# Patient Record
Sex: MALE | Race: WHITE | NOT HISPANIC OR LATINO | Employment: STUDENT | URBAN - METROPOLITAN AREA
[De-identification: names, ages, dates, MRNs, and addresses within clinical notes are randomized per-mention and may not be internally consistent; named-entity substitution may affect disease eponyms.]

---
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
## 2024-02-07 NOTE — PROGRESS NOTES
CC:  Chronic low back pain.  Right sided.    18 y.o. Male who presents as a new patient to me. 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 Complaint is right hip pain and right lower back pain for many years. He reports the pain began when he was playing football and got hit directly onto his right lower back and iliac crest region. 3 years ago he was seen by Peds Ortho and underwent PT for his low back which helped alleviate his symptoms. He subsequently went on to finish high school sports and now plays for [employer]. He reports last year he had another flare up of back pain which time he was seen by a chiropractor for treatment.  He states those treatments helped, but the relief was always temporary. He rates his pain as a baseline 2/10.  Right side of the lower back region.  Denies any numbness or tingling or sciatica type symptoms.  He notices it most after he throws bullpen, not necessarily while pitching. Better with rest. Treatment thus far has included rest, activity modifications, oral medications and PT. Here today to discuss diagnosis and treatment options.      PMHx notable for chronic LBP.   Negative for tobacco.   Negative for diabetes. Last A1C: n/a    REVIEW OF SYSTEMS:   Constitution: Negative. Negative for chills, fever and night sweats.    Hematologic/Lymphatic: Negative for bleeding problem. Does not bruise/bleed easily.   Skin: Negative for dry skin, itching and rash.   Musculoskeletal: Negative for falls. Positive for chronic lumbar pain.     All other review of symptoms were reviewed and found to be noncontributory.     PAST MEDICAL HISTORY:   Past Medical History:   Diagnosis Date    Asthma      PAST SURGICAL HISTORY:   History reviewed. No pertinent surgical history.    FAMILY HISTORY:   History reviewed. No pertinent family history.    SOCIAL HISTORY:   Social History     Socioeconomic History    Marital status: Single   Tobacco Use    Smoking status: Never    Smokeless tobacco:  "Never   Substance and Sexual Activity    Alcohol use: No    Drug use: No    Sexual activity: Never   Social History Narrative    11th grader    Lives with parents    2 siblings     Plays football and baseball      MEDICATIONS:     Current Outpatient Medications:     budesonide/formoterol fumarate (SYMBICORT INHL), Inhale into the lungs., Disp: , Rfl:     ibuprofen (ADVIL,MOTRIN) 100 mg/5 mL suspension, Take by mouth every 6 (six) hours as needed for Temperature greater than., Disp: , Rfl:     montelukast sodium (SINGULAIR ORAL), Take by mouth., Disp: , Rfl:     celecoxib (CELEBREX) 200 MG capsule, Take 1 capsule (200 mg total) by mouth 2 (two) times daily., Disp: 30 capsule, Rfl: 2    ALLERGIES:   Review of patient's allergies indicates:   Allergen Reactions    Latex, natural rubber Hives     PHYSICAL EXAMINATION:  /67   Pulse (!) 53   Ht 5' 10" (1.778 m)   Wt 90.7 kg (200 lb)   BMI 28.70 kg/m²   General: Well-developed well-nourished 18 y.o. malein no acute distress   Cardiovascular: Regular rhythm by palpation of distal pulse, normal color and temperature, no concerning varicosities on symptomatic side   Lungs: No labored breathing or wheezing appreciated   Neuro: Alert and oriented ×3   Psychiatric: well oriented to person, place and time, demonstrates normal mood and affect   Skin: No rashes, lesions or ulcers, normal temperature, turgor, and texture on involved extremity    Ortho/SPM Exam  Examination of the lumbar spine demonstrates mildly reduced lumbar range of motion with some pain provoked on forced extension.  Some generalized tightness with forward bending.  Tenderness to palpation at the L3 to 5 level more so on the right side.  No specific paraspinal tenderness.  Nontender over the SI joints.  No tenderness specifically over the midline.  Negative straight leg raise on the right or left lower extremity.  Motor and sensory intact to the right lower extremity.  Negative Stinchfield test.  No " pain with passive internal and external rotation of the right hip.  5/5 resisted quad, hamstring, anterior tibialis, EHL, gastrocsoleus.  Sensory intact to the entire right lower extremity.  No pathologic reflexes.  Knee popliteal angle is 80° bilaterally.  Some subjective core weakness.    IMAGING:  X-rays of lumbar spine ordered and images reviewed by me show:    Demonstrates no acute fracture. Disc heights appear to be well-maintained thought the visualizes thoracic and lumbar spines. No evidence of zeus or retrolisthesis. Normal lumbar lordosis.  No obvious spondylolysis.  No spondylolisthesis.      ASSESSMENT:      ICD-10-CM ICD-9-CM   1. Chronic right-sided low back pain without sciatica  M54.50 724.2    G89.29 338.29       PLAN:     -Findings and treatment options were discussed with the patient.  History of chronic recurrent low back pain, right-sided more than left in the absence of radicular symptoms.  Prior conservative treatment has been extensive.  Pain present after pitching primarily and is to some degree activity limiting.  Plain films were negative but certainly the patient may have a spondylolysis.  We discussed further workup to include MRI.  He would like to proceed with that.  -PT Mikki (Back/Core Strengthening) - go ahead and start that.  We will have Mickey Chakraborty coordinate with the patient's school  for treatment at school as well.  -Celebrex QD 200mg  -RTC virtually to discuss MRI results and further treatment recommendations.  -All questions answered

## 2024-02-08 ENCOUNTER — TELEPHONE (OUTPATIENT)
Dept: SPORTS MEDICINE | Facility: CLINIC | Age: 19
End: 2024-02-08
Payer: COMMERCIAL

## 2024-02-08 ENCOUNTER — HOSPITAL ENCOUNTER (OUTPATIENT)
Dept: RADIOLOGY | Facility: HOSPITAL | Age: 19
Discharge: HOME | End: 2024-02-08
Attending: ORTHOPAEDIC SURGERY
Payer: COMMERCIAL

## 2024-02-08 ENCOUNTER — OFFICE VISIT (OUTPATIENT)
Dept: SPORTS MEDICINE | Facility: CLINIC | Age: 19
End: 2024-02-08
Payer: COMMERCIAL

## 2024-02-08 VITALS
BODY MASS INDEX: 28.63 KG/M2 | HEART RATE: 53 BPM | DIASTOLIC BLOOD PRESSURE: 67 MMHG | WEIGHT: 200 LBS | HEIGHT: 70 IN | SYSTOLIC BLOOD PRESSURE: 125 MMHG

## 2024-02-08 DIAGNOSIS — G89.29 CHRONIC RIGHT-SIDED LOW BACK PAIN WITHOUT SCIATICA: Primary | ICD-10-CM

## 2024-02-08 DIAGNOSIS — M54.50 CHRONIC RIGHT-SIDED LOW BACK PAIN WITHOUT SCIATICA: Primary | ICD-10-CM

## 2024-02-08 DIAGNOSIS — M54.50 LUMBAR PAIN: ICD-10-CM

## 2024-02-08 PROCEDURE — 99999 PR PBB SHADOW E&M-EST. PATIENT-LVL IV: CPT | Mod: PBBFAC,,, | Performed by: ORTHOPAEDIC SURGERY

## 2024-02-08 PROCEDURE — 3078F DIAST BP <80 MM HG: CPT | Mod: CPTII,S$GLB,, | Performed by: ORTHOPAEDIC SURGERY

## 2024-02-08 PROCEDURE — 72110 X-RAY EXAM L-2 SPINE 4/>VWS: CPT | Mod: TC

## 2024-02-08 PROCEDURE — 3008F BODY MASS INDEX DOCD: CPT | Mod: CPTII,S$GLB,, | Performed by: ORTHOPAEDIC SURGERY

## 2024-02-08 PROCEDURE — 99204 OFFICE O/P NEW MOD 45 MIN: CPT | Mod: S$GLB,,, | Performed by: ORTHOPAEDIC SURGERY

## 2024-02-08 PROCEDURE — 3074F SYST BP LT 130 MM HG: CPT | Mod: CPTII,S$GLB,, | Performed by: ORTHOPAEDIC SURGERY

## 2024-02-08 PROCEDURE — 72110 X-RAY EXAM L-2 SPINE 4/>VWS: CPT | Mod: 26,,, | Performed by: RADIOLOGY

## 2024-02-08 PROCEDURE — 1159F MED LIST DOCD IN RCRD: CPT | Mod: CPTII,S$GLB,, | Performed by: ORTHOPAEDIC SURGERY

## 2024-02-08 RX ORDER — CELECOXIB 200 MG/1
200 CAPSULE ORAL 2 TIMES DAILY
Qty: 30 CAPSULE | Refills: 2 | Status: SHIPPED | OUTPATIENT
Start: 2024-02-08 | End: 2024-02-19 | Stop reason: SDUPTHER

## 2024-02-08 NOTE — TELEPHONE ENCOUNTER
Spoke c pt. Informed pt that Dr. Rosa will no longer be seeing pts at scheduled appt time on 02/08/24. Pt will head to clinic now. Confirmed appt date, time, location. Pt will call c additional questions/concerns in interim. Pt expressed understanding & was thankful.

## 2024-02-16 ENCOUNTER — CLINICAL SUPPORT (OUTPATIENT)
Dept: REHABILITATION | Facility: HOSPITAL | Age: 19
End: 2024-02-16
Payer: COMMERCIAL

## 2024-02-16 DIAGNOSIS — G89.29 CHRONIC RIGHT-SIDED LOW BACK PAIN WITHOUT SCIATICA: ICD-10-CM

## 2024-02-16 DIAGNOSIS — R29.3 ABNORMAL POSTURE: Primary | ICD-10-CM

## 2024-02-16 DIAGNOSIS — M54.50 CHRONIC RIGHT-SIDED LOW BACK PAIN WITHOUT SCIATICA: ICD-10-CM

## 2024-02-16 PROCEDURE — 97161 PT EVAL LOW COMPLEX 20 MIN: CPT | Mod: PO

## 2024-02-16 PROCEDURE — 97110 THERAPEUTIC EXERCISES: CPT | Mod: PO

## 2024-02-16 NOTE — PLAN OF CARE
[facility]   Physical Therapy Initial Evaluation      Name: Donald Grewal  Clinic Number: [id number]    Therapy Diagnosis:   Encounter Diagnoses   Name Primary?    Chronic right-sided low back pain without sciatica     Abnormal posture Yes        Physician: Bam Woodward DO    Physician Orders: PT Eval and Treat   Medical Diagnosis from Referral: Chronic right-sided low back pain without sciatica [M54.50, G89.29]   Evaluation Date: 2/16/2024  Authorization Period Expiration: 12/31/2024  Plan of Care Expiration: 5/16/2024  Progress Note Due: 3/16/2024  Date of Surgery: na  Visit # / Visits authorized: 1/ 1   FOTO: 1/ 3    Precautions: Standard ,asthma, latex allergy     Time In: 8:00 a   Time Out: 9:00 a  Total Billable Time: 60 minutes    Subjective     Date of onset: 2.5 years ago     History of current condition - Donald reports:     He has right sided lower back pain that began 2.5 years ago after being tackled during football. Sometimes the pain goes into the right glute but most of the time it stays in his back. 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 He will be getting an MRI on Sunday. He denies back surgeries, NT, denies bladder or bowel problems. He has been the DC and PT before and it both helped him     Chief complaint: right sided lumbar pain following pitching     Falls: denies     Imaging:   FINDINGS:  Very slight scoliosis convex to the left in the lumbar region is appreciated, but no significant alignment abnormality is observed.  Vertebral body heights are normally maintained, without compression deformity at any level.  No significant disc narrowing.  No spondylitic defects.  No significant vertebral segmentation anomalies.  Vertebral endplates are well defined.  No osseous destructive process.  SI joints appear unremarkable.     Impression:     No significant abnormality.  No significant detrimental  interval change since the examinations referenced above is appreciated.    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    Medications:   Donald has a current medication list which includes the following prescription(s): budesonide/formoterol fumarate, celecoxib, ibuprofen, and montelukast sodium.    Allergies:   Review of patient's allergies indicates:   Allergen Reactions    Latex, natural rubber Hives        Objective      LE MMT   Glute med 4+/5 bilat   Glute max 4+/5 bilat     LE MYOTOMES WNL     SPECIAL TESTS   SLR neg bilat   Si jt compression/distraction neg   LLNT neg sciatic nerve   Femoral compression negative bilat     POSTURE   APT, forward head, rounded shoulders     PALPATION   Mild ttp R si joint, R QL   Increased muscle tone right sided T-s/L-s     MOBILITY   TS 2/6   LS 3/6   UPA lumbar no reactivity     MLA   Short hip flexors, piriformis, hs, quads     LS AROM   Flexion wfl * preference   Ext wfl * p   Rotation L wfl   Rotation R wfl, * p ipsilateral   SB wfl bilat     Intake Outcome Measure for FOTO  Survey    Therapist reviewed FOTO scores for Donald Grewal on 2/16/2024.   FOTO report - see Media section or FOTO account episode details.    Intake Score: 62%         Treatment     Total Treatment time (time-based codes) separate from Evaluation: 20 minutes     Donald received the treatments listed below:      therapeutic exercises to develop strength, endurance, ROM, flexibility, posture, and core stabilization for 20 minutes  including:    Hs stretch strap 3 x 20s   Supine hip ER IR AROM 30x   Quadruped fire hydrants  5 sec holds x 10 green   Prone on SB 3 x 45 sec     Next visit consider:   STM self massage with lax ball   Thread needles         Patient Education and Home Exercises     Education provided:   - HEP  - Etiology    Written Home Exercises Provided: yes. Exercises were reviewed and Donald was able to demonstrate them prior to the end of the session.  Donald demonstrated good  understanding of the education provided. See EMR under Patient Instructions for exercises provided during therapy sessions.    Assessment     Donald is a 18 y.o. male referred to outpatient Physical Therapy with a medical diagnosis of Chronic right-sided low back pain without sciatica [M54.50, G89.29] . Patient presents with s/s consistent with LUMBAR SPINE PAIN with POWER COORDINATION DEFICITS. Pt presents with LE flexibility and strength deficits like contributing to his symptoms. Pt with signs of likely right sided lumbar spine closing dysfunction. Pt will benefit from skilled PT for return to pitching and college sports without any limitations.     Patient prognosis is Excellent.   Patient will benefit from skilled outpatient Physical Therapy to address the deficits stated above and in the chart below, provide patient /family education, and to maximize patientt's level of independence.     Plan of care discussed with patient: Yes  Patient's spiritual, cultural and educational needs considered and patient is agreeable to the plan of care and goals as stated below:     Anticipated Barriers for therapy: none     Medical Necessity is demonstrated by the following  History  Co-morbidities and personal factors that may impact the plan of care [x] LOW: no personal factors / co-morbidities  [] MODERATE: 1-2 personal factors / co-morbidities  [] HIGH: 3+ personal factors / co-morbidities    Moderate / High Support Documentation:   Co-morbidities affecting plan  of care:     Personal Factors:        Examination  Body Structures and Functions, activity limitations and participation restrictions that may impact the plan of care [x] LOW: addressing 1-2 elements  [] MODERATE: 3+ elements  [] HIGH: 4+ elements (please support below)    Moderate / High Support Documentation:      Clinical Presentation [x] LOW: stable  [] MODERATE: Evolving  [] HIGH: Unstable     Decision Making/ Complexity Score: low       Goals:  Short Term Goals: 4 weeks   Pt demonstrate indep with initial hep   Pt demonstrate lumbar spine AROM pain-free all directions     Long Term Goals: 12 weeks   Pt demonstrate 5/5 gluteal mmt to improve strength and stability for pitching   Pt demonstrate improved foto score from 62 to 75  Pt report no difficulty or pain pitching 40 pitches in a game.   Plan     Plan of care Certification: 2/16/2024 to 5/16/2024.    Outpatient Physical Therapy 2 times weekly for 10 weeks to include the following interventions: Electrical Stimulation precautions cleared , Manual Therapy, Moist Heat/ Ice, Neuromuscular Re-ed, Patient Education, Self Care, Therapeutic Activities, and Therapeutic Exercise.     Jay Gustafson, PT        Physician's Signature: _________________________________________ Date: ________________

## 2024-02-18 ENCOUNTER — HOSPITAL ENCOUNTER (OUTPATIENT)
Dept: RADIOLOGY | Facility: HOSPITAL | Age: 19
Discharge: HOME | End: 2024-02-18
Attending: STUDENT IN AN ORGANIZED HEALTH CARE EDUCATION/TRAINING PROGRAM
Payer: COMMERCIAL

## 2024-02-18 DIAGNOSIS — G89.29 CHRONIC RIGHT-SIDED LOW BACK PAIN WITHOUT SCIATICA: ICD-10-CM

## 2024-02-18 DIAGNOSIS — M54.50 CHRONIC RIGHT-SIDED LOW BACK PAIN WITHOUT SCIATICA: ICD-10-CM

## 2024-02-18 PROCEDURE — 72148 MRI LUMBAR SPINE W/O DYE: CPT | Mod: TC

## 2024-02-18 PROCEDURE — 72148 MRI LUMBAR SPINE W/O DYE: CPT | Mod: 26,,, | Performed by: RADIOLOGY

## 2024-02-18 NOTE — PROGRESS NOTES
Telemedicine/Virtual Visit Documentation:     The patient location is: home    The chief complaint leading to consultation is: see HPI    VISIT TYPE X   Virtual visit with synchronous audio and video x   Telephone E/M service      Total time spent with patient: see X ely on chart below.   More than half of the time was spent counseling or coordinating care including prognosis, differential diagnosis, risks and benefits of treatment, instructions, compliance risk reductions     EST MINUTES X   20079 5    68140 10    19029 15 x   99214 25    62893 40    NEW     37198 10    34660 20    62444 30    71213 45    54981 60    PHONE      5-10    58196 11-20    98883 21-30      H&P  Orthopaedics      SUBJECTIVE:     History of Present Illness:    Donald Grewal who presents for MRI review of lumbar spine. Concern for lumbar disc pathology.  The patient states his back is feeling better.  He has started physical therapy at the [facility].  Has not picked up or started the Celebrex.  He does feel that he can throw and hit in that respect can return to practice.  He is redshirting this year.  No lumbar radicular complaints.    Prior Hx 2/8/2024:  18 y.o. Male who presents as a new patient to me. 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 Complaint is right hip pain and right lower back pain for many years. He reports the pain began when he was playing football and got hit directly onto his right lower back and iliac crest region. 3 years ago he was seen by Peds Ortho and underwent PT for his low back which helped alleviate his symptoms. He subsequently went on to finish high school sports and now plays for [employer]. He reports last year he had another flare up of back pain which time he was seen by a chiropractor for treatment.  He states those treatments helped, but the relief was always temporary. He rates his pain as a baseline 2/10.  Right side of the lower back region.  Denies any numbness or tingling or  sciatica type symptoms.  He notices it most after he throws bullpen, not necessarily while pitching. Better with rest. Treatment thus far has included rest, activity modifications, oral medications and PT. Here today to discuss diagnosis and treatment options.       PMHx notable for chronic LBP.   Negative for tobacco.   Negative for diabetes. Last A1C: n/a    Review of patient's allergies indicates:   Allergen Reactions    Latex, natural rubber Hives       Past Medical History:   Diagnosis Date    Asthma      No past surgical history on file.  No family history on file.  Social History     Tobacco Use    Smoking status: Never    Smokeless tobacco: Never   Substance Use Topics    Alcohol use: No    Drug use: No      Review of Systems:  Patient denies constitutional symptoms, cardiac symptoms, respiratory symptoms, GI symptoms.  The remainder of the musculoskeletal ROS is included in the HPI.    OBJECTIVE:     Physical Exam:  Gen:  No acute distress    MSK:  MRI lumbar spine reviewed by me and discussed with patient. Study shows:   Mild broad-based posterior disc bulge at L5-S1. No spinal canal stenosis.     ASSESSMENT/PLAN:     A/P: Donald Grewal is a 18 y.o. with L5-S1 disc herniation    Plan:  Imaging reviewed with the patient.  Conservative care recommended.  Getting better with the initial physical therapy.  Prescription for Celebrex resubmitted to a new pharmacy location.  May participate in baseball activities to tolerance.  I do not think we need any further workup or treatment at this time.  I will discuss with his school .  All questions answered.  Continue a core strengthening and hamstring stretching program.

## 2024-02-19 ENCOUNTER — OFFICE VISIT (OUTPATIENT)
Dept: SPORTS MEDICINE | Facility: CLINIC | Age: 19
End: 2024-02-19
Payer: COMMERCIAL

## 2024-02-19 DIAGNOSIS — M54.50 CHRONIC RIGHT-SIDED LOW BACK PAIN WITHOUT SCIATICA: ICD-10-CM

## 2024-02-19 DIAGNOSIS — G89.29 CHRONIC RIGHT-SIDED LOW BACK PAIN WITHOUT SCIATICA: ICD-10-CM

## 2024-02-19 DIAGNOSIS — M51.26 LUMBAR DISC HERNIATION: Primary | ICD-10-CM

## 2024-02-19 PROCEDURE — 99213 OFFICE O/P EST LOW 20 MIN: CPT | Mod: 95,,, | Performed by: ORTHOPAEDIC SURGERY

## 2024-02-19 RX ORDER — CELECOXIB 200 MG/1
200 CAPSULE ORAL 2 TIMES DAILY
Qty: 30 CAPSULE | Refills: 2 | Status: SHIPPED | OUTPATIENT
Start: 2024-02-19

## 2024-02-26 ENCOUNTER — CLINICAL SUPPORT (OUTPATIENT)
Dept: REHABILITATION | Facility: HOSPITAL | Age: 19
End: 2024-02-26
Payer: COMMERCIAL

## 2024-02-26 DIAGNOSIS — R29.3 ABNORMAL POSTURE: Primary | ICD-10-CM

## 2024-02-26 PROCEDURE — 97110 THERAPEUTIC EXERCISES: CPT | Mod: PO

## 2024-02-26 NOTE — PROGRESS NOTES
"[facility]   Physical Therapy Treatment Note      Name: Donald Grewal  Clinic Number: [id number]    Therapy Diagnosis:   Encounter Diagnosis   Name Primary?    Abnormal posture Yes     Physician: Bam Woodward DO    Visit Date: 2/26/2024    Physician Orders: PT Eval and Treat   Medical Diagnosis from Referral: Chronic right-sided low back pain without sciatica [M54.50, G89.29]   Evaluation Date: 2/16/2024  Authorization Period Expiration: 12/31/2024  Plan of Care Expiration: 5/16/2024  Progress Note Due: 3/16/2024  Date of Surgery: na  Visit # / Visits authorized: 1/ 20   FOTO: 1/ 3     Precautions: Standard ,asthma, latex allergy      Time In: 4:00  Time Out: 4:40  Total Billable Time: 40 minutes    PTA Visit #: 0/5       Subjective     Patient reports: felt better after last visit .  He was compliant with home exercise program.  Response to previous treatment: initial eval  Functional change: ongoing    Pain: 0/10  Location: right lower thoracic and lumbar spine      Objective      Objective Measures updated at progress report unless specified.     Treatment     Donald received the treatments listed below:      therapeutic exercises to develop strength, endurance, ROM, flexibility, posture, and core stabilization for 40 minutes including:  Supine hamstring stretch 3x30"  Supine hip flexor stretch x1 min B  Supine hip ER IR AROM x30 B   Thread the needle x10 B  Open books 15x5" B  Quadruped fire hydrants  5 sec holds x 10 green   Bird dogs 2x10  Planks 2x30" each way   Paloff press with green superband 2x10   Prone on SB 3 x 45 sec      Next visit consider:   STM self massage with lax ball       manual therapy techniques: Joint mobilizations, Manual traction, and Soft tissue Mobilization were applied for 0 minutes, including:      neuromuscular re-education activities to improve: Coordination, Kinesthetic, Sense, and Proprioception for 0 minutes. The following activities were " included:      therapeutic activities to improve functional performance for 0  minutes, including:        Patient Education and Home Exercises       Education provided:   - HEP  - Etiology     Written Home Exercises Provided: yes. Exercises were reviewed and Donald was able to demonstrate them prior to the end of the session.  Donald demonstrated good  understanding of the education provided. See EMR under Patient Instructions for exercises provided during therapy sessions.    Assessment     Donald tolerated session well today with some reports of pain with quadruped bird dogs and paloff press on right side. Despite discomfort, he was able to complete exercises. Will continue to progress with focus on spinal mobility and core strengthening and proper core engagement during dynamic activity.     Donald Is progressing well towards his goals.   Patient prognosis is Excellent.     Patient will continue to benefit from skilled outpatient physical therapy to address the deficits listed in the problem list box on initial evaluation, provide pt/family education and to maximize pt's level of independence in the home and community environment.     Patient's spiritual, cultural and educational needs considered and pt agreeable to plan of care and goals.     Anticipated barriers to physical therapy: yes    Goals: (not met, progressing unless otherwise)  Short Term Goals: 4 weeks   Pt demonstrate indep with initial hep   Pt demonstrate lumbar spine AROM pain-free all directions      Long Term Goals: 12 weeks   Pt demonstrate 5/5 gluteal mmt to improve strength and stability for pitching   Pt demonstrate improved foto score from 62 to 75  Pt report no difficulty or pain pitching 40 pitches in a game.     Plan       Plan of care Certification: 2/16/2024 to 5/16/2024.     Continue plan of care    Bonita Sellers, PT      The patient is a 52y Male complaining of

## 2024-02-28 ENCOUNTER — CLINICAL SUPPORT (OUTPATIENT)
Dept: REHABILITATION | Facility: HOSPITAL | Age: 19
End: 2024-02-28
Payer: COMMERCIAL

## 2024-02-28 DIAGNOSIS — R29.3 ABNORMAL POSTURE: Primary | ICD-10-CM

## 2024-02-28 PROCEDURE — 97110 THERAPEUTIC EXERCISES: CPT | Mod: PO

## 2024-02-28 PROCEDURE — 97140 MANUAL THERAPY 1/> REGIONS: CPT | Mod: PO

## 2024-02-28 NOTE — PROGRESS NOTES
"[facility]   Physical Therapy Treatment Note      Name: Donald Grewal  Clinic Number: [id number]    Therapy Diagnosis:   Encounter Diagnosis   Name Primary?    Abnormal posture Yes     Physician: Bam Woodward DO    Visit Date: 2/28/2024    Physician Orders: PT Eval and Treat   Medical Diagnosis from Referral: Chronic right-sided low back pain without sciatica [M54.50, G89.29]   Evaluation Date: 2/16/2024  Authorization Period Expiration: 12/31/2024  Plan of Care Expiration: 5/16/2024  Progress Note Due: 3/16/2024  Date of Surgery: na  Visit # / Visits authorized: 2/ 20   FOTO: 1/ 3     Precautions: Standard ,asthma, latex allergy      Time In: 4:30 p   Time Out: 5:20 p   Total Billable Time: 25 minutes 1-1 with PT     PTA Visit #: 0/5       Subjective     Patient reports: his symptoms are about the same. 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   He was compliant with home exercise program.  Response to previous treatment: ongoing   Functional change: ongoing    Pain: 2/10  Location: right lower thoracic and lumbar spine      Objective      Objective Measures updated at progress report unless specified.     Treatment     Donald received the treatments listed below:      therapeutic exercises to develop strength, endurance, ROM, flexibility, posture, and core stabilization for 40 minutes including:  Kneeling hip flexor stretch 3 x 20 s   Thread the needle x10 B  Open books 15x5" B  Planks 2x30"   Paloff press with green superband 2x15   Prone on SB 3 x 45 sec     NOT PERFORMED:   Supine hamstring stretch 3x30"  Supine hip ER IR AROM x30 B   Quadruped fire hydrants  5 sec holds x 10 green   Bird dogs 2x10    Next visit consider:   STM self massage with lax ball       manual therapy techniques: Joint mobilizations, Manual traction, and Soft tissue Mobilization were applied for 10 minutes, including:  STM thoracolumbar paraspinals right side   TS/LS PA grade I.II.       neuromuscular re-education activities " to improve: Coordination, Kinesthetic, Sense, and Proprioception for 0 minutes. The following activities were included:      therapeutic activities to improve functional performance for 0  minutes, including:        Patient Education and Home Exercises       Education provided:   - HEP       Written Home Exercises Provided: Exercises were reviewed and Donald was able to demonstrate them prior to the end of the session.  Donald demonstrated good  understanding of the education provided. See EMR under Patient Instructions for exercises provided during therapy sessions.    Assessment     Pt demonstrates good response to current interventions. All exercises were pain-free and performed well. Manual therapy initiated to decrease low back pain and improve mobility. Noted right sided hypertonicity of TL junction paraspinals. Plan to progress with emphasis on spinal mobility and core strengthening.     Donald Is progressing well towards his goals.   Patient prognosis is Excellent.     Patient will continue to benefit from skilled outpatient physical therapy to address the deficits listed in the problem list box on initial evaluation, provide pt/family education and to maximize pt's level of independence in the home and community environment.     Patient's spiritual, cultural and educational needs considered and pt agreeable to plan of care and goals.     Anticipated barriers to physical therapy: yes    Goals: (not met, progressing unless otherwise)  Short Term Goals: 4 weeks   Pt demonstrate indep with initial hep   Pt demonstrate lumbar spine AROM pain-free all directions      Long Term Goals: 12 weeks   Pt demonstrate 5/5 gluteal mmt to improve strength and stability for pitching   Pt demonstrate improved foto score from 62 to 75  Pt report no difficulty or pain pitching 40 pitches in a game.     Plan       Plan of care Certification: 2/16/2024 to 5/16/2024.     Continue plan of care    Jay Gustafson, PT

## 2024-03-02 ENCOUNTER — ATHLETIC TRAINING SESSION (OUTPATIENT)
Dept: SPORTS MEDICINE | Facility: CLINIC | Age: 19
End: 2024-03-02
Payer: COMMERCIAL

## 2024-03-02 DIAGNOSIS — M51.26 LUMBAR DISC HERNIATION: Primary | ICD-10-CM

## 2024-03-02 NOTE — PROGRESS NOTES
Subjective:       Chief Complaint: Donald Grewal is a 18 y.o. male student at [employer]) who had concerns including Pain of the Lower Back.    Donald is a  at [employer]. He suffers from chronic low back pain that was made worse buy the teams  activities. He is now in formal PT as well as coming into the [facility] for rehab.     Handedness: right-handed  Sport played: baseball      Level: college      Position:pitcher      Pain        ROS              Objective:       General: Donald is well-developed, well-nourished, appears stated age, in no acute distress, alert and oriented to time, place and person.     AT Session          Assessment:     Status: F - Full Participation    Date Seen:  2/21/24 and 2/29/24    Date of Injury:  chronic    Date Out:  NA    Date Cleared:  NA      Plan:   Donald completed:    [x]  INJURY TREATMENT   []  MAINTENANCE  DATE OF SERVICE: 2/29/24  INJURY/CONDITON: Low back pain    Donald received the selected modalities after being cleared for contradictions.  Donald received education on potenital side effects of the selected modalities and agreed to treatment.      MODALITIES:    Cryotherapy / Thermotherapy Duration  (Mins) Add. Tx Parameters / Comment   []Cold Tub / Whirlpool (50-60 F)     []Contrast Bath (105-110 F & 50-65 F)     []Game Ready     []Hot Pack     []Hot Tub / Whirlpool ( F)     []Ice Massage     []Ice Pack     []Paraffin Wax (126-130 F)     []Vapocoolant Spray        Comment:       Electrotherapy Waveform   (AC/DC) Modulation (Cont./Interrupted/Surged) Intensity   (V) Pulse Width/Dur.  (uS) Pulse Rate/Freq.  (Hz, PPS or CPS) Duration  (Mins) Add. Tx Parameters / Comment   []Combo          []E-Stim - IFC          []E-Stim - Premod          []E-Stim - [place]          []E-Stim - TENS          []E-Stim - Other          []Iontophoresis        Meds:     Comment:      Ultrasound Duty Cycle   (%) Freq.  (Mhz) Intensity   (w/cm2)  Duration  (Mins) Add. Tx Parameters / Comment   []Combo        []Phonophoresis     Meds:   []Ultrasound         []Ultrasound and E-Stim          Comment:        Massage Duration  (Mins) Add. Tx Parameters / Comment   []Massage - IASTM     []Massage - Scar Tissue     []Massage - Self Administered     []Massage - Therapeutic     []Myofascial Release        Comment:      Other Modalities Duration  (Mins)  Add. Tx Parameters / Comment   []Active Release     []Cupping     []Dry Needling     []Intermittent Compression      []Laser     []Lightwave     []Traction      []Other:       Comment:      THERAPEUTIC EXERCISES:    Stretching Cardio Rehab Other   []Stretching - Active []Cardio - Bike []Rehab - Ankle/Foot []Agility []PNF   []Stretching - Dynamic []Cardio - Elliptical []Rehab - Knee []Balance []ROM - Active   []Stretching - Passive []Cardio - Jog/Run []Rehab - Hip []Blood Flow Restriction []ROM - Passive   []Stretching - PNF []Cardio - Treadmill []Rehab - Wrist/Hand []Foam Roller []RTP - Concussion Protocol   []Stretching - Static []Cardio - Upper Body Ergometer []Rehab - Elbow []Functional Exercises []RTP - Sport Specific    []Cardio - Walk []Rehab - Shoulder []Joint Mobilization []Strengthening Exercises     []Rehab - Neck/Spine []Manual Therapy []Other:     [x]Rehab - Back []Plyometric Exercises      []Rehab - Other       Comment:            Warm-Up Reps/Sets/Time Weight #                         Exercise Reps/Sets/Time Weight #   Pelvic titls  3 x 10     Cat cows  3 x 10     Bridge marches  3  x 10    Heel taps with magic [place]  3 x 10  Magic [place]                                    Comment:      Miscellaneous Add. Tx Parameters / Comment   []Compression Wrap    []Support Wrap    []Taping - Preventative    []Taping - Injured Part    []Wound Care    []Other:      Comment:         Donald completed:    [x]  INJURY TREATMENT   []  MAINTENANCE  DATE OF SERVICE: 2/21/24  INJURY/CONDITON: Lower back pain    Donald  received the selected modalities after being cleared for contradictions.  Donald received education on potenital side effects of the selected modalities and agreed to treatment.      MODALITIES:    Cryotherapy / Thermotherapy Duration  (Mins) Add. Tx Parameters / Comment   []Cold Tub / Whirlpool (50-60 F)     []Contrast Bath (105-110 F & 50-65 F)     []Game Ready     []Hot Pack     []Hot Tub / Whirlpool ( F)     []Ice Massage     []Ice Pack     []Paraffin Wax (126-130 F)     []Vapocoolant Spray        Comment:       Electrotherapy Waveform   (AC/DC) Modulation (Cont./Interrupted/Surged) Intensity   (V) Pulse Width/Dur.  (uS) Pulse Rate/Freq.  (Hz, PPS or CPS) Duration  (Mins) Add. Tx Parameters / Comment   []Combo          []E-Stim - IFC          []E-Stim - Premod          []E-Stim - [place]          []E-Stim - TENS          []E-Stim - Other          []Iontophoresis        Meds:     Comment:      Ultrasound Duty Cycle   (%) Freq.  (Mhz) Intensity   (w/cm2) Duration  (Mins) Add. Tx Parameters / Comment   []Combo        []Phonophoresis     Meds:   []Ultrasound         []Ultrasound and E-Stim          Comment:        Massage Duration  (Mins) Add. Tx Parameters / Comment   []Massage - IASTM     []Massage - Scar Tissue     []Massage - Self Administered     []Massage - Therapeutic     []Myofascial Release        Comment:      Other Modalities Duration  (Mins)  Add. Tx Parameters / Comment   []Active Release     []Cupping     []Dry Needling     []Intermittent Compression      []Laser     []Lightwave     []Traction      []Other:       Comment:      THERAPEUTIC EXERCISES:    Stretching Cardio Rehab Other   []Stretching - Active []Cardio - Bike []Rehab - Ankle/Foot []Agility []PNF   []Stretching - Dynamic []Cardio - Elliptical []Rehab - Knee []Balance []ROM - Active   []Stretching - Passive []Cardio - Jog/Run []Rehab - Hip []Blood Flow Restriction []ROM - Passive   []Stretching - PNF []Cardio - Treadmill []Rehab -  Wrist/Hand []Foam Roller []RTP - Concussion Protocol   []Stretching - Static []Cardio - Upper Body Ergometer []Rehab - Elbow []Functional Exercises []RTP - Sport Specific    []Cardio - Walk []Rehab - Shoulder []Joint Mobilization []Strengthening Exercises     []Rehab - Neck/Spine []Manual Therapy []Other:     [x]Rehab - Back []Plyometric Exercises      []Rehab - Other       Comment:            Warm-Up Reps/Sets/Time Weight #                         Exercise Reps/Sets/Time Weight #   Pelvic tilts  3 x 10     6in leg lifts  3 x 10     Deadbugs  3 x 10  Red theraband   Palloff press  3 x 10  Orange heavy resistance band                                    Comment:      Miscellaneous Add. Tx Parameters / Comment   []Compression Wrap    []Support Wrap    []Taping - Preventative    []Taping - Injured Part    []Wound Care    []Other:      Comment:

## 2024-03-07 ENCOUNTER — CLINICAL SUPPORT (OUTPATIENT)
Dept: REHABILITATION | Facility: HOSPITAL | Age: 19
End: 2024-03-07
Payer: COMMERCIAL

## 2024-03-07 DIAGNOSIS — R29.3 ABNORMAL POSTURE: Primary | ICD-10-CM

## 2024-03-07 PROCEDURE — 97140 MANUAL THERAPY 1/> REGIONS: CPT | Mod: PO

## 2024-03-07 PROCEDURE — 97110 THERAPEUTIC EXERCISES: CPT | Mod: PO

## 2024-03-07 NOTE — PROGRESS NOTES
"[facility]   Physical Therapy Treatment Note      Name: Donald Grewal  Clinic Number: [id number]    Therapy Diagnosis:   Encounter Diagnosis   Name Primary?    Abnormal posture Yes       Physician: Bam Woodward DO    Visit Date: 3/7/2024    Physician Orders: PT Eval and Treat   Medical Diagnosis from Referral: Chronic right-sided low back pain without sciatica [M54.50, G89.29]   Evaluation Date: 2/16/2024  Authorization Period Expiration: 12/31/2024  Plan of Care Expiration: 5/16/2024  Progress Note Due: 3/16/2024  Date of Surgery: na  Visit # / Visits authorized: 3/ 20   FOTO: 1/ 3     Precautions: Standard ,asthma, latex allergy      Time In: 2:00 p   Time Out: 2:55 p   Total Billable Time: 55 minutes      PTA Visit #: 0/5       Subjective     Patient reports: he has a little soreness in his back but he is having less pain than before.   He was compliant with home exercise program.  Response to previous treatment: ongoing   Functional change: ongoing    Pain: 0/10  Location: right lower thoracic and lumbar spine      Objective      Objective Measures updated at progress report unless specified.     Treatment     Donald received the treatments listed below:      therapeutic exercises to develop strength, endurance, ROM, flexibility, posture, and core stabilization for 30 minutes including:  Kneeling hip flexor stretch 3 x 20 s  + active SB   Thread the needle x10 B  Open books 15x5" B  Paloff press with green superband 2x15   Prone on SB 3 x 45 sec   Cable chops hi to low 13lbs , 2 x 10       manual therapy techniques: Joint mobilizations, Manual traction, and Soft tissue Mobilization were applied for 25 minutes, including:  STM thoracolumbar paraspinals with hypervolt   TS/LS PA grade I.II.III.       neuromuscular re-education activities to improve: Coordination, Kinesthetic, Sense, and Proprioception for 0 minutes. The following activities were included:      therapeutic activities to " improve functional performance for 0  minutes, including:        Patient Education and Home Exercises       Education provided:   - HEP review and handout provided        Written Home Exercises Provided: Exercises were reviewed and Donald was able to demonstrate them prior to the end of the session.  Donald demonstrated good  understanding of the education provided. See EMR under Patient Instructions for exercises provided during therapy sessions.    Assessment     Pt demonstrates positive carry over between visits with improving lumbar spine pain. Pt was educated on comprehensive HEP and provided handout. Plan next visit to review goals, educate on posture and discharge if appropriate.     Donald Is progressing well towards his goals.   Patient prognosis is Excellent.     Patient will continue to benefit from skilled outpatient physical therapy to address the deficits listed in the problem list box on initial evaluation, provide pt/family education and to maximize pt's level of independence in the home and community environment.     Patient's spiritual, cultural and educational needs considered and pt agreeable to plan of care and goals.     Anticipated barriers to physical therapy: yes    Goals: (not met, progressing unless otherwise)  Short Term Goals: 4 weeks   Pt demonstrate indep with initial hep   Pt demonstrate lumbar spine AROM pain-free all directions      Long Term Goals: 12 weeks   Pt demonstrate 5/5 gluteal mmt to improve strength and stability for pitching   Pt demonstrate improved foto score from 62 to 75  Pt report no difficulty or pain pitching 40 pitches in a game.     Plan       Plan of care Certification: 2/16/2024 to 5/16/2024.     Continue plan of care    Jay Gustafson, PT

## 2024-03-28 ENCOUNTER — ATHLETIC TRAINING SESSION (OUTPATIENT)
Dept: SPORTS MEDICINE | Facility: CLINIC | Age: 19
End: 2024-03-28
Payer: COMMERCIAL

## 2024-03-28 DIAGNOSIS — M51.26 LUMBAR DISC HERNIATION: Primary | ICD-10-CM

## 2024-03-28 NOTE — PROGRESS NOTES
Subjective:       Chief Complaint: Donald Grewal is a 18 y.o. male student at [employer]) who had concerns including Injury of the Lower Back (Herniated disk ).    Donald is a  at [employer]. He has a herniated disk in his L spine area. He was doing formal PT and is now just coming into the ATR.     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     AT Session          Assessment:     Status: AT - Cleared to Exert    Date Seen:  3/11/24-3/26/24    Date of Injury:  Chronic- diagnosed with disk herniation in feb.     Date Out:  Feb. 2024 limited     Date Cleared:  March 2024      Plan:       Donald completed:    [x]  INJURY TREATMENT   []  MAINTENANCE  DATE OF SERVICE: 3/28/24  INJURY/CONDITON: Low back pain    Donald received the selected modalities after being cleared for contradictions.  Donald received education on potenital side effects of the selected modalities and agreed to treatment.      MODALITIES:    Cryotherapy / Thermotherapy Duration  (Mins) Add. Tx Parameters / Comment   []Cold Tub / Whirlpool (50-60 F)     []Contrast Bath (105-110 F & 50-65 F)     []Game Ready     []Hot Pack     []Hot Tub / Whirlpool ( F)     []Ice Massage     []Ice Pack     []Paraffin Wax (126-130 F)     []Vapocoolant Spray        Comment:       Electrotherapy Waveform   (AC/DC) Modulation (Cont./Interrupted/Surged) Intensity   (V) Pulse Width/Dur.  (uS) Pulse Rate/Freq.  (Hz, PPS or CPS) Duration  (Mins) Add. Tx Parameters / Comment   []Combo          []E-Stim - IFC          []E-Stim - Premod          []E-Stim - Liberian          []E-Stim - TENS          []E-Stim - Other          []Iontophoresis        Meds:     Comment:      Ultrasound Duty Cycle   (%)  Freq.  (Mhz) Intensity   (w/cm2) Duration  (Mins) Add. Tx Parameters / Comment   []Combo        []Phonophoresis     Meds:   []Ultrasound         []Ultrasound and E-Stim          Comment:        Massage Duration  (Mins) Add. Tx Parameters / Comment   []Massage - IASTM     []Massage - Scar Tissue     []Massage - Self Administered     []Massage - Therapeutic     []Myofascial Release        Comment:      Other Modalities Duration  (Mins)  Add. Tx Parameters / Comment   []Active Release     []Cupping     []Dry Needling     []Intermittent Compression      []Laser     []Lightwave     []Traction      []Other:       Comment:      THERAPEUTIC EXERCISES:    Stretching Cardio Rehab Other   []Stretching - Active []Cardio - Bike []Rehab - Ankle/Foot []Agility []PNF   []Stretching - Dynamic []Cardio - Elliptical []Rehab - Knee []Balance []ROM - Active   []Stretching - Passive []Cardio - Jog/Run []Rehab - Hip []Blood Flow Restriction []ROM - Passive   []Stretching - PNF []Cardio - Treadmill []Rehab - Wrist/Hand []Foam Roller []RTP - Concussion Protocol   []Stretching - Static []Cardio - Upper Body Ergometer []Rehab - Elbow []Functional Exercises []RTP - Sport Specific    []Cardio - Walk []Rehab - Shoulder []Joint Mobilization []Strengthening Exercises     []Rehab - Neck/Spine []Manual Therapy []Other:     [x]Rehab - Back []Plyometric Exercises      []Rehab - Other       Comment:            Warm-Up Reps/Sets/Time Weight #                         Exercise Reps/Sets/Time Weight #   Lunge w/ OH KB press  3 x 10  7.5 lbs    Woodchopper w/ KB  4 x 10  10 lbs    Earthquake palloff press 4 x 10  7.5 lbs    Supermans  3 x 10     Knee tucks with ball  3  x 10  4 lbs                               Comment:      Miscellaneous Add. Tx Parameters / Comment   []Compression Wrap    []Support Wrap    []Taping - Preventative    []Taping - Injured Part    []Wound Care    []Other:      Comment:       Donald completed:    [x]  INJURY TREATMENT   []   MAINTENANCE  DATE OF SERVICE: 3/26/24  INJURY/CONDITON: Lower back herniated disk    Donald received the selected modalities after being cleared for contradictions.  Donald received education on potenital side effects of the selected modalities and agreed to treatment.      MODALITIES:    Cryotherapy / Thermotherapy Duration  (Mins) Add. Tx Parameters / Comment   []Cold Tub / Whirlpool (50-60 F)     []Contrast Bath (105-110 F & 50-65 F)     []Game Ready     []Hot Pack     []Hot Tub / Whirlpool ( F)     []Ice Massage     []Ice Pack     []Paraffin Wax (126-130 F)     []Vapocoolant Spray        Comment:       Electrotherapy Waveform   (AC/DC) Modulation (Cont./Interrupted/Surged) Intensity   (V) Pulse Width/Dur.  (uS) Pulse Rate/Freq.  (Hz, PPS or CPS) Duration  (Mins) Add. Tx Parameters / Comment   []Combo          []E-Stim - IFC          []E-Stim - Premod          []E-Stim - Cuban          []E-Stim - TENS          []E-Stim - Other          []Iontophoresis        Meds:     Comment:      Ultrasound Duty Cycle   (%) Freq.  (Mhz) Intensity   (w/cm2) Duration  (Mins) Add. Tx Parameters / Comment   []Combo        []Phonophoresis     Meds:   []Ultrasound         []Ultrasound and E-Stim          Comment:        Massage Duration  (Mins) Add. Tx Parameters / Comment   []Massage - IASTM     []Massage - Scar Tissue     []Massage - Self Administered     []Massage - Therapeutic     []Myofascial Release        Comment:      Other Modalities Duration  (Mins)  Add. Tx Parameters / Comment   []Active Release     []Cupping     []Dry Needling     []Intermittent Compression      []Laser     []Lightwave     []Traction      []Other:       Comment:      THERAPEUTIC EXERCISES:    Stretching Cardio Rehab Other   []Stretching - Active []Cardio - Bike []Rehab - Ankle/Foot []Agility []PNF   []Stretching - Dynamic []Cardio - Elliptical []Rehab - Knee []Balance []ROM - Active   []Stretching - Passive []Cardio - Jog/Run []Rehab - Hip []Blood  Flow Restriction []ROM - Passive   []Stretching - PNF []Cardio - Treadmill []Rehab - Wrist/Hand []Foam Roller []RTP - Concussion Protocol   []Stretching - Static []Cardio - Upper Body Ergometer []Rehab - Elbow []Functional Exercises []RTP - Sport Specific    []Cardio - Walk []Rehab - Shoulder []Joint Mobilization []Strengthening Exercises     []Rehab - Neck/Spine []Manual Therapy []Other:     [x]Rehab - Back []Plyometric Exercises      []Rehab - Other       Comment:            Warm-Up Reps/Sets/Time Weight #                         Exercise Reps/Sets/Time Weight #   Pelvic tilts  3 x 10     Dead bugs  3 x 10  Green theraband    Scissor kick ball pass  3 x 10     Bottoms up KB march  3 x 10  5 lbs    Bosu squats  3 x 10                                Comment:      Miscellaneous Add. Tx Parameters / Comment   []Compression Wrap    []Support Wrap    []Taping - Preventative    []Taping - Injured Part    []Wound Care    []Other:      Comment:         Donald completed:    [x]  INJURY TREATMENT   []  MAINTENANCE  DATE OF SERVICE: 3/22/24  INJURY/CONDITON: Lower back herniated disk    Donald received the selected modalities after being cleared for contradictions.  Donald received education on potenital side effects of the selected modalities and agreed to treatment.      MODALITIES:    Cryotherapy / Thermotherapy Duration  (Mins) Add. Tx Parameters / Comment   []Cold Tub / Whirlpool (50-60 F)     []Contrast Bath (105-110 F & 50-65 F)     []Game Ready     []Hot Pack     []Hot Tub / Whirlpool ( F)     []Ice Massage     []Ice Pack     []Paraffin Wax (126-130 F)     []Vapocoolant Spray        Comment:       Electrotherapy Waveform   (AC/DC) Modulation (Cont./Interrupted/Surged) Intensity   (V) Pulse Width/Dur.  (uS) Pulse Rate/Freq.  (Hz, PPS or CPS) Duration  (Mins) Add. Tx Parameters / Comment   []Combo          []E-Stim - IFC          []E-Stim - Premod          []E-Stim - Argentine          []E-Stim - TENS          []E-Stim  - Other          []Iontophoresis        Meds:     Comment:      Ultrasound Duty Cycle   (%) Freq.  (Mhz) Intensity   (w/cm2) Duration  (Mins) Add. Tx Parameters / Comment   []Combo        []Phonophoresis     Meds:   []Ultrasound         []Ultrasound and E-Stim          Comment:        Massage Duration  (Mins) Add. Tx Parameters / Comment   []Massage - IASTM     []Massage - Scar Tissue     []Massage - Self Administered     []Massage - Therapeutic     []Myofascial Release        Comment:      Other Modalities Duration  (Mins)  Add. Tx Parameters / Comment   []Active Release     []Cupping     []Dry Needling     []Intermittent Compression      []Laser     []Lightwave     []Traction      []Other:       Comment:      THERAPEUTIC EXERCISES:    Stretching Cardio Rehab Other   []Stretching - Active []Cardio - Bike []Rehab - Ankle/Foot []Agility []PNF   []Stretching - Dynamic []Cardio - Elliptical []Rehab - Knee []Balance []ROM - Active   []Stretching - Passive []Cardio - Jog/Run []Rehab - Hip []Blood Flow Restriction []ROM - Passive   []Stretching - PNF []Cardio - Treadmill []Rehab - Wrist/Hand []Foam Roller []RTP - Concussion Protocol   []Stretching - Static []Cardio - Upper Body Ergometer []Rehab - Elbow []Functional Exercises []RTP - Sport Specific    []Cardio - Walk []Rehab - Shoulder []Joint Mobilization []Strengthening Exercises     []Rehab - Neck/Spine []Manual Therapy []Other:     [x]Rehab - Back []Plyometric Exercises      []Rehab - Other       Comment:            Warm-Up Reps/Sets/Time Weight #                         Exercise Reps/Sets/Time Weight #   Dead bugs  3 x 10  Green theraband    Bridge marches  3 x 10  Green theraband    Scissor kick ball pass  3 x 10     Foam roller side extensions  3 x 10     Bird dog w/ slider  3 x 10                                Comment:      Miscellaneous Add. Tx Parameters / Comment   []Compression Wrap    []Support Wrap    []Taping - Preventative    []Taping - Injured Part     []Wound Care    []Other:      Comment:       Donald completed:    [x]  INJURY TREATMENT   []  MAINTENANCE  DATE OF SERVICE: 3/20/24  INJURY/CONDITON: lower back disk herniation     Donald received the selected modalities after being cleared for contradictions.  Donald received education on potenital side effects of the selected modalities and agreed to treatment.      MODALITIES:    Cryotherapy / Thermotherapy Duration  (Mins) Add. Tx Parameters / Comment   []Cold Tub / Whirlpool (50-60 F)     []Contrast Bath (105-110 F & 50-65 F)     []Game Ready     []Hot Pack     []Hot Tub / Whirlpool ( F)     []Ice Massage     []Ice Pack     []Paraffin Wax (126-130 F)     []Vapocoolant Spray        Comment:       Electrotherapy Waveform   (AC/DC) Modulation (Cont./Interrupted/Surged) Intensity   (V) Pulse Width/Dur.  (uS) Pulse Rate/Freq.  (Hz, PPS or CPS) Duration  (Mins) Add. Tx Parameters / Comment   []Combo          []E-Stim - IFC          []E-Stim - Premod          []E-Stim - Dominican          []E-Stim - TENS          []E-Stim - Other          []Iontophoresis        Meds:     Comment:      Ultrasound Duty Cycle   (%) Freq.  (Mhz) Intensity   (w/cm2) Duration  (Mins) Add. Tx Parameters / Comment   []Combo        []Phonophoresis     Meds:   []Ultrasound         []Ultrasound and E-Stim          Comment:        Massage Duration  (Mins) Add. Tx Parameters / Comment   []Massage - IASTM     []Massage - Scar Tissue     []Massage - Self Administered     []Massage - Therapeutic     []Myofascial Release        Comment:      Other Modalities Duration  (Mins)  Add. Tx Parameters / Comment   []Active Release     []Cupping     []Dry Needling     []Intermittent Compression      []Laser     []Lightwave     []Traction      []Other:       Comment:      THERAPEUTIC EXERCISES:    Stretching Cardio Rehab Other   []Stretching - Active []Cardio - Bike []Rehab - Ankle/Foot []Agility []PNF   []Stretching - Dynamic []Cardio - Elliptical []Rehab -  Knee []Balance []ROM - Active   []Stretching - Passive []Cardio - Jog/Run []Rehab - Hip []Blood Flow Restriction []ROM - Passive   []Stretching - PNF []Cardio - Treadmill []Rehab - Wrist/Hand []Foam Roller []RTP - Concussion Protocol   []Stretching - Static []Cardio - Upper Body Ergometer []Rehab - Elbow []Functional Exercises []RTP - Sport Specific    []Cardio - Walk []Rehab - Shoulder []Joint Mobilization []Strengthening Exercises     []Rehab - Neck/Spine []Manual Therapy []Other:     [x]Rehab - Back []Plyometric Exercises      []Rehab - Other       Comment:            Warm-Up Reps/Sets/Time Weight #                         Exercise Reps/Sets/Time Weight #   Pelvic tilts  3 x 10     Leg lift 6 in  3 x 10     Palloff press  3 x 10  Orange heavy resistance band    Scissor kick ball pass  3 x 10                                     Comment:      Miscellaneous Add. Tx Parameters / Comment   []Compression Wrap    []Support Wrap    []Taping - Preventative    []Taping - Injured Part    []Wound Care    []Other:      Comment:       Donald completed:    [x]  INJURY TREATMENT   []  MAINTENANCE  DATE OF SERVICE: 3/12/24  INJURY/CONDITON: Lower back disk herniation     Donald received the selected modalities after being cleared for contradictions.  Donald received education on potenital side effects of the selected modalities and agreed to treatment.      MODALITIES:    Cryotherapy / Thermotherapy Duration  (Mins) Add. Tx Parameters / Comment   []Cold Tub / Whirlpool (50-60 F)     []Contrast Bath (105-110 F & 50-65 F)     []Game Ready     []Hot Pack     []Hot Tub / Whirlpool ( F)     []Ice Massage     []Ice Pack     []Paraffin Wax (126-130 F)     []Vapocoolant Spray        Comment:       Electrotherapy Waveform   (AC/DC) Modulation (Cont./Interrupted/Surged) Intensity   (V) Pulse Width/Dur.  (uS) Pulse Rate/Freq.  (Hz, PPS or CPS) Duration  (Mins) Add. Tx Parameters / Comment   []Combo          []E-Stim - IFC           []E-Stim - Premod          []E-Stim - Cameroonian          []E-Stim - TENS          []E-Stim - Other          []Iontophoresis        Meds:     Comment:      Ultrasound Duty Cycle   (%) Freq.  (Mhz) Intensity   (w/cm2) Duration  (Mins) Add. Tx Parameters / Comment   []Combo        []Phonophoresis     Meds:   []Ultrasound         []Ultrasound and E-Stim          Comment:        Massage Duration  (Mins) Add. Tx Parameters / Comment   []Massage - IASTM     []Massage - Scar Tissue     []Massage - Self Administered     []Massage - Therapeutic     []Myofascial Release        Comment:      Other Modalities Duration  (Mins)  Add. Tx Parameters / Comment   []Active Release     []Cupping     []Dry Needling     []Intermittent Compression      []Laser     []Lightwave     []Traction      []Other:       Comment:      THERAPEUTIC EXERCISES:    Stretching Cardio Rehab Other   []Stretching - Active []Cardio - Bike []Rehab - Ankle/Foot []Agility []PNF   []Stretching - Dynamic []Cardio - Elliptical []Rehab - Knee []Balance []ROM - Active   []Stretching - Passive []Cardio - Jog/Run []Rehab - Hip []Blood Flow Restriction []ROM - Passive   []Stretching - PNF []Cardio - Treadmill []Rehab - Wrist/Hand []Foam Roller []RTP - Concussion Protocol   []Stretching - Static []Cardio - Upper Body Ergometer []Rehab - Elbow []Functional Exercises []RTP - Sport Specific    []Cardio - Walk []Rehab - Shoulder []Joint Mobilization []Strengthening Exercises     []Rehab - Neck/Spine []Manual Therapy []Other:     [x]Rehab - Back []Plyometric Exercises      []Rehab - Other       Comment:            Warm-Up Reps/Sets/Time Weight #                         Exercise Reps/Sets/Time Weight #   Pelvic tilts  3 x 10     Cat cows  3 x 10     Scissor ball pass  3 x 10     Reverse table top heel taps  3 x 10     Foam roller side extension  3 x 10     Bird dog with roller  3 x 10                           Comment:      Miscellaneous Add. Tx Parameters / Comment    []Compression Wrap    []Support Wrap    []Taping - Preventative    []Taping - Injured Part    []Wound Care    []Other:      Comment:       Donald completed:    [x]  INJURY TREATMENT   []  MAINTENANCE  DATE OF SERVICE: 3/11/24  INJURY/CONDITON: Low back pain    Donald received the selected modalities after being cleared for contradictions.  Donald received education on potenital side effects of the selected modalities and agreed to treatment.      MODALITIES:    Cryotherapy / Thermotherapy Duration  (Mins) Add. Tx Parameters / Comment   []Cold Tub / Whirlpool (50-60 F)     []Contrast Bath (105-110 F & 50-65 F)     []Game Ready     []Hot Pack     []Hot Tub / Whirlpool ( F)     []Ice Massage     []Ice Pack     []Paraffin Wax (126-130 F)     []Vapocoolant Spray        Comment:       Electrotherapy Waveform   (AC/DC) Modulation (Cont./Interrupted/Surged) Intensity   (V) Pulse Width/Dur.  (uS) Pulse Rate/Freq.  (Hz, PPS or CPS) Duration  (Mins) Add. Tx Parameters / Comment   []Combo          []E-Stim - IFC          []E-Stim - Premod          []E-Stim - Ecuadorean          []E-Stim - TENS          []E-Stim - Other          []Iontophoresis        Meds:     Comment:      Ultrasound Duty Cycle   (%) Freq.  (Mhz) Intensity   (w/cm2) Duration  (Mins) Add. Tx Parameters / Comment   []Combo        []Phonophoresis     Meds:   []Ultrasound         []Ultrasound and E-Stim          Comment:        Massage Duration  (Mins) Add. Tx Parameters / Comment   []Massage - IASTM     []Massage - Scar Tissue     []Massage - Self Administered     []Massage - Therapeutic     []Myofascial Release        Comment:      Other Modalities Duration  (Mins)  Add. Tx Parameters / Comment   []Active Release     []Cupping     []Dry Needling     []Intermittent Compression      []Laser     []Lightwave     []Traction      []Other:       Comment:      THERAPEUTIC EXERCISES:    Stretching Cardio Rehab Other   []Stretching - Active []Cardio - Bike []Rehab -  Ankle/Foot []Agility []PNF   []Stretching - Dynamic []Cardio - Elliptical []Rehab - Knee []Balance []ROM - Active   []Stretching - Passive []Cardio - Jog/Run []Rehab - Hip []Blood Flow Restriction []ROM - Passive   []Stretching - PNF []Cardio - Treadmill []Rehab - Wrist/Hand []Foam Roller []RTP - Concussion Protocol   []Stretching - Static []Cardio - Upper Body Ergometer []Rehab - Elbow []Functional Exercises []RTP - Sport Specific    []Cardio - Walk []Rehab - Shoulder []Joint Mobilization []Strengthening Exercises     []Rehab - Neck/Spine []Manual Therapy []Other:     [x]Rehab - Back []Plyometric Exercises      []Rehab - Other       Comment:            Warm-Up Reps/Sets/Time Weight #                         Exercise Reps/Sets/Time Weight #   Leg lift 6 in  3 x 10     Deadbugs  3 x 10  Red theraband    Pall off press  3 x 10  Orange heavy resistance band    Bridge marches  3 x 10     Windshield wipers  2 x 10     Scissor kick ball pass  3 x 10                           Comment:      Miscellaneous Add. Tx Parameters / Comment   []Compression Wrap    []Support Wrap    []Taping - Preventative    []Taping - Injured Part    []Wound Care    []Other:      Comment:

## 2024-04-09 ENCOUNTER — ATHLETIC TRAINING SESSION (OUTPATIENT)
Dept: SPORTS MEDICINE | Facility: CLINIC | Age: 19
End: 2024-04-09
Payer: COMMERCIAL

## 2024-04-09 DIAGNOSIS — M51.26 LUMBAR DISC HERNIATION: Primary | ICD-10-CM

## 2024-04-09 NOTE — PROGRESS NOTES
Reason for Encounter Follow-Up    Subjective:       Chief Complaint: Donald Grewal is a 18 y.o. male student at [employer]) who had concerns including Injury of the Lower Back.    Donald is a  at [employer]. He has a lumbar disk herniation and we are doing rehab to focus on core strength and pain relief. Donald has had improvement with rehab.     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     AT Session          Assessment:     Status: AT - Cleared to Exert    Date Seen: 4/1/24    Date of Injury: Chronic- diagnosed with disk herniation in feb.     Date Out: Feb. 2024 limited     Date Cleared: March 2024      Plan:         Donald completed:    [x]  INJURY TREATMENT   []  MAINTENANCE  DATE OF SERVICE: 4/8/24   INJURY/CONDITON: L spine disk herniation    Donald received the selected modalities after being cleared for contradictions.  Donald received education on potenital side effects of the selected modalities and agreed to treatment.      MODALITIES:    Cryotherapy / Thermotherapy Duration  (Mins) Add. Tx Parameters / Comment   []Cold Tub / Whirlpool (50-60 F)     []Contrast Bath (105-110 F & 50-65 F)     []Game Ready     []Hot Pack     []Hot Tub / Whirlpool ( F)     []Ice Massage     []Ice Pack     []Paraffin Wax (126-130 F)     []Vapocoolant Spray        Comment:       Electrotherapy Waveform   (AC/DC) Modulation (Cont./Interrupted/Surged) Intensity   (V) Pulse Width/Dur.  (uS) Pulse Rate/Freq.  (Hz, PPS or CPS) Duration  (Mins) Add. Tx Parameters / Comment   []Combo          []E-Stim - IFC          []E-Stim - Premod          []E-Stim - Syrian          []E-Stim - TENS          []E-Stim - Other          []Iontophoresis        Meds:      Comment:      Ultrasound Duty Cycle   (%) Freq.  (Mhz) Intensity   (w/cm2) Duration  (Mins) Add. Tx Parameters / Comment   []Combo        []Phonophoresis     Meds:   []Ultrasound         []Ultrasound and E-Stim          Comment:        Massage Duration  (Mins) Add. Tx Parameters / Comment   []Massage - IASTM     []Massage - Scar Tissue     []Massage - Self Administered     []Massage - Therapeutic     []Myofascial Release        Comment:      Other Modalities Duration  (Mins)  Add. Tx Parameters / Comment   []Active Release     []Cupping     []Dry Needling     []Intermittent Compression      []Laser     []Lightwave     []Traction      []Other:       Comment:      THERAPEUTIC EXERCISES:    Stretching Cardio Rehab Other   []Stretching - Active []Cardio - Bike []Rehab - Ankle/Foot []Agility []PNF   []Stretching - Dynamic []Cardio - Elliptical []Rehab - Knee []Balance []ROM - Active   []Stretching - Passive []Cardio - Jog/Run []Rehab - Hip []Blood Flow Restriction []ROM - Passive   []Stretching - PNF []Cardio - Treadmill []Rehab - Wrist/Hand []Foam Roller []RTP - Concussion Protocol   []Stretching - Static []Cardio - Upper Body Ergometer []Rehab - Elbow []Functional Exercises []RTP - Sport Specific    []Cardio - Walk []Rehab - Shoulder []Joint Mobilization []Strengthening Exercises     []Rehab - Neck/Spine []Manual Therapy []Other:     [x]Rehab - Back []Plyometric Exercises      []Rehab - Other       Comment:            Warm-Up Reps/Sets/Time Weight #                         Exercise Reps/Sets/Time Weight #   Lunge w/ OH KB press 3 x 10  10 lbs    Woodchopper w/ KB  4 x 10  10 lbs    Earthquake palloff press  3 x 10  7.5 lbs    Knee tucks w/ ball  3 x 10  4 lbs    Groin rocks  1 x 10                                Comment:      Miscellaneous Add. Tx Parameters / Comment   []Compression Wrap    []Support Wrap    []Taping - Preventative    []Taping - Injured Part    []Wound Care    []Other:      Comment:

## 2024-04-09 NOTE — PROGRESS NOTES
Reason for Encounter Follow-Up    Subjective:       Chief Complaint: Donald Grewal is a 18 y.o. male student at [employer]) who had concerns including Injury of the Lower Back.    Donald is a  at [employer]. He has a L spine disk herniation. We are doing rehab focused on core strength and pain reduction. Donald has seen an improvement since starting rehab.     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     AT Session          Assessment:     Status: AT - Cleared to Exert    Date Seen: 4/1/24    Date of Injury: Chronic- diagnosed with disk herniation in feb.     Date Out: Feb. 2024 limited     Date Cleared: March 2024      Plan:     Donald completed:    [x]  INJURY TREATMENT   []  MAINTENANCE  DATE OF SERVICE: 4/5/24  INJURY/CONDITON: L spine disk herniation    Donald received the selected modalities after being cleared for contradictions.  Donald received education on potenital side effects of the selected modalities and agreed to treatment.      MODALITIES:    Cryotherapy / Thermotherapy Duration  (Mins) Add. Tx Parameters / Comment   []Cold Tub / Whirlpool (50-60 F)     []Contrast Bath (105-110 F & 50-65 F)     []Game Ready     []Hot Pack     []Hot Tub / Whirlpool ( F)     []Ice Massage     []Ice Pack     []Paraffin Wax (126-130 F)     []Vapocoolant Spray        Comment:       Electrotherapy Waveform   (AC/DC) Modulation (Cont./Interrupted/Surged) Intensity   (V) Pulse Width/Dur.  (uS) Pulse Rate/Freq.  (Hz, PPS or CPS) Duration  (Mins) Add. Tx Parameters / Comment   []Combo          []E-Stim - IFC          []E-Stim - Premod          []E-Stim - Canadian          []E-Stim - TENS          []E-Stim - Other          []Iontophoresis         Meds:     Comment:      Ultrasound Duty Cycle   (%) Freq.  (Mhz) Intensity   (w/cm2) Duration  (Mins) Add. Tx Parameters / Comment   []Combo        []Phonophoresis     Meds:   []Ultrasound         []Ultrasound and E-Stim          Comment:        Massage Duration  (Mins) Add. Tx Parameters / Comment   []Massage - IASTM     []Massage - Scar Tissue     []Massage - Self Administered     []Massage - Therapeutic     []Myofascial Release        Comment:      Other Modalities Duration  (Mins)  Add. Tx Parameters / Comment   []Active Release     []Cupping     []Dry Needling     []Intermittent Compression      []Laser     []Lightwave     []Traction      []Other:       Comment:      THERAPEUTIC EXERCISES:    Stretching Cardio Rehab Other   []Stretching - Active []Cardio - Bike []Rehab - Ankle/Foot []Agility []PNF   []Stretching - Dynamic []Cardio - Elliptical []Rehab - Knee []Balance []ROM - Active   []Stretching - Passive []Cardio - Jog/Run []Rehab - Hip []Blood Flow Restriction []ROM - Passive   []Stretching - PNF []Cardio - Treadmill []Rehab - Wrist/Hand []Foam Roller []RTP - Concussion Protocol   []Stretching - Static []Cardio - Upper Body Ergometer []Rehab - Elbow []Functional Exercises []RTP - Sport Specific    []Cardio - Walk []Rehab - Shoulder []Joint Mobilization []Strengthening Exercises     []Rehab - Neck/Spine []Manual Therapy []Other:     [x]Rehab - Back []Plyometric Exercises      []Rehab - Other       Comment:            Warm-Up Reps/Sets/Time Weight #                         Exercise Reps/Sets/Time Weight #   Earthquake palloff press 4 x 10  7.5 lbs    Deadbugs  3 x 10  Green theraband 4lbs    Walking leg circles  3 x 10  Green theraband                                         Comment:      Miscellaneous Add. Tx Parameters / Comment   []Compression Wrap    []Support Wrap    []Taping - Preventative    []Taping - Injured Part    []Wound Care    []Other:      Comment:       Donald completed:    [x]  INJURY  TREATMENT   []  MAINTENANCE  DATE OF SERVICE: 4/1/24  INJURY/CONDITON: L spine disk herniation    Donald received the selected modalities after being cleared for contradictions.  Donald received education on potenital side effects of the selected modalities and agreed to treatment.      MODALITIES:    Cryotherapy / Thermotherapy Duration  (Mins) Add. Tx Parameters / Comment   []Cold Tub / Whirlpool (50-60 F)     []Contrast Bath (105-110 F & 50-65 F)     []Game Ready     []Hot Pack     []Hot Tub / Whirlpool ( F)     []Ice Massage     []Ice Pack     []Paraffin Wax (126-130 F)     []Vapocoolant Spray        Comment:       Electrotherapy Waveform   (AC/DC) Modulation (Cont./Interrupted/Surged) Intensity   (V) Pulse Width/Dur.  (uS) Pulse Rate/Freq.  (Hz, PPS or CPS) Duration  (Mins) Add. Tx Parameters / Comment   []Combo          []E-Stim - IFC          []E-Stim - Premod          []E-Stim - Vincentian          []E-Stim - TENS          []E-Stim - Other          []Iontophoresis        Meds:     Comment:      Ultrasound Duty Cycle   (%) Freq.  (Mhz) Intensity   (w/cm2) Duration  (Mins) Add. Tx Parameters / Comment   []Combo        []Phonophoresis     Meds:   []Ultrasound         []Ultrasound and E-Stim          Comment:        Massage Duration  (Mins) Add. Tx Parameters / Comment   []Massage - IASTM     []Massage - Scar Tissue     []Massage - Self Administered     []Massage - Therapeutic     []Myofascial Release        Comment:      Other Modalities Duration  (Mins)  Add. Tx Parameters / Comment   []Active Release     []Cupping     []Dry Needling     []Intermittent Compression      []Laser     []Lightwave     []Traction      []Other:       Comment:      THERAPEUTIC EXERCISES:    Stretching Cardio Rehab Other   []Stretching - Active []Cardio - Bike []Rehab - Ankle/Foot []Agility []PNF   []Stretching - Dynamic []Cardio - Elliptical []Rehab - Knee []Balance []ROM - Active   []Stretching - Passive []Cardio - Jog/Run []Rehab -  Hip []Blood Flow Restriction []ROM - Passive   []Stretching - PNF []Cardio - Treadmill []Rehab - Wrist/Hand []Foam Roller []RTP - Concussion Protocol   []Stretching - Static []Cardio - Upper Body Ergometer []Rehab - Elbow []Functional Exercises []RTP - Sport Specific    []Cardio - Walk []Rehab - Shoulder []Joint Mobilization []Strengthening Exercises     []Rehab - Neck/Spine []Manual Therapy []Other:     [x]Rehab - Back []Plyometric Exercises      []Rehab - Other       Comment:            Warm-Up Reps/Sets/Time Weight #                         Exercise Reps/Sets/Time Weight #   Lunge with KB overhead press  3 x 10  10 lbs    Woodchoppers w/ KB 4 x 10  10 lbs    Supermans  3 x 10     Dead bugs  3 x 10  Green theraband 4 lbs    Walking leg circles  3 x 10  Green theraband                               Comment:      Miscellaneous Add. Tx Parameters / Comment   []Compression Wrap    []Support Wrap    []Taping - Preventative    []Taping - Injured Part    []Wound Care    []Other:      Comment:

## 2024-04-23 ENCOUNTER — ATHLETIC TRAINING SESSION (OUTPATIENT)
Dept: SPORTS MEDICINE | Facility: CLINIC | Age: 19
End: 2024-04-23
Payer: COMMERCIAL

## 2024-04-23 DIAGNOSIS — M51.26 LUMBAR DISC HERNIATION: Primary | ICD-10-CM

## 2024-04-23 NOTE — PROGRESS NOTES
Reason for Encounter Follow-Up    Subjective:       Chief Complaint: Donald Grewal is a 18 y.o. male student at [employer]) who had concerns including Injury of the Lower Back.    Donald has a herniated disk in his lumbar spine. We are doing rehab to focus on core strength and strength with in movements that are related to baseball pitching.     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     AT Session  Donald completed:    [x]  INJURY TREATMENT   []  MAINTENANCE  DATE OF SERVICE: 4/18/24  INJURY/CONDITON: Lumbar disk hernation    Donald received the selected modalities after being cleared for contradictions.  Donald received education on potenital side effects of the selected modalities and agreed to treatment.      MODALITIES:    Cryotherapy / Thermotherapy Duration  (Mins) Add. Tx Parameters / Comment   []Cold Tub / Whirlpool (50-60 F)     []Contrast Bath (105-110 F & 50-65 F)     []Game Ready     []Hot Pack     []Hot Tub / Whirlpool ( F)     []Ice Massage     []Ice Pack     []Paraffin Wax (126-130 F)     []Vapocoolant Spray        Comment:       Electrotherapy Waveform   (AC/DC) Modulation (Cont./Interrupted/Surged) Intensity   (V) Pulse Width/Dur.  (uS) Pulse Rate/Freq.  (Hz, PPS or CPS) Duration  (Mins) Add. Tx Parameters / Comment   []Combo          []E-Stim - IFC          []E-Stim - Premod          []E-Stim - Namibian          []E-Stim - TENS          []E-Stim - Other          []Iontophoresis        Meds:     Comment:      Ultrasound Duty Cycle   (%) Freq.  (Mhz) Intensity   (w/cm2) Duration  (Mins) Add. Tx Parameters / Comment   []Combo        []Phonophoresis     Meds:   []Ultrasound         []Ultrasound and E-Stim          Comment:        Massage  Duration  (Mins) Add. Tx Parameters / Comment   []Massage - IASTM     []Massage - Scar Tissue     []Massage - Self Administered     []Massage - Therapeutic     []Myofascial Release        Comment:      Other Modalities Duration  (Mins)  Add. Tx Parameters / Comment   []Active Release     []Cupping     []Dry Needling     []Intermittent Compression      []Laser     []Lightwave     []Traction      []Other:       Comment:      THERAPEUTIC EXERCISES:    Stretching Cardio Rehab Other   []Stretching - Active []Cardio - Bike []Rehab - Ankle/Foot []Agility []PNF   []Stretching - Dynamic []Cardio - Elliptical []Rehab - Knee []Balance []ROM - Active   []Stretching - Passive []Cardio - Jog/Run []Rehab - Hip []Blood Flow Restriction []ROM - Passive   []Stretching - PNF []Cardio - Treadmill []Rehab - Wrist/Hand []Foam Roller []RTP - Concussion Protocol   []Stretching - Static []Cardio - Upper Body Ergometer []Rehab - Elbow []Functional Exercises []RTP - Sport Specific    []Cardio - Walk []Rehab - Shoulder []Joint Mobilization []Strengthening Exercises     []Rehab - Neck/Spine []Manual Therapy []Other:     [x]Rehab - Back []Plyometric Exercises      []Rehab - Other       Comment:            Warm-Up Reps/Sets/Time Weight #                         Exercise Reps/Sets/Time Weight #   Lunge w/ OH press  3 x 10  10 lbs    Woodchopper w/ KB  4 x 10  10 lbs    Earthquake palloff press  4 x 10  7.5 lbs + orange heavy resistance band   Supermans  3 x 10     90/90  2 x 10     Groin rocks  2 x 10  10 lbs                          Comment:      Miscellaneous Add. Tx Parameters / Comment   []Compression Wrap    []Support Wrap    []Taping - Preventative    []Taping - Injured Part    []Wound Care    []Other:      Comment:       Donald completed:    [x]  INJURY TREATMENT   []  MAINTENANCE  DATE OF SERVICE: 4/17/24  INJURY/CONDITON: lumbar disk herniation    Donald received the selected modalities after being cleared for contradictions.  Donald  received education on potenital side effects of the selected modalities and agreed to treatment.      MODALITIES:    Cryotherapy / Thermotherapy Duration  (Mins) Add. Tx Parameters / Comment   []Cold Tub / Whirlpool (50-60 F)     []Contrast Bath (105-110 F & 50-65 F)     []Game Ready     []Hot Pack     []Hot Tub / Whirlpool ( F)     []Ice Massage     []Ice Pack     []Paraffin Wax (126-130 F)     []Vapocoolant Spray        Comment:       Electrotherapy Waveform   (AC/DC) Modulation (Cont./Interrupted/Surged) Intensity   (V) Pulse Width/Dur.  (uS) Pulse Rate/Freq.  (Hz, PPS or CPS) Duration  (Mins) Add. Tx Parameters / Comment   []Combo          []E-Stim - IFC          []E-Stim - Premod          []E-Stim - Dutch          []E-Stim - TENS          []E-Stim - Other          []Iontophoresis        Meds:     Comment:      Ultrasound Duty Cycle   (%) Freq.  (Mhz) Intensity   (w/cm2) Duration  (Mins) Add. Tx Parameters / Comment   []Combo        []Phonophoresis     Meds:   []Ultrasound         []Ultrasound and E-Stim          Comment:        Massage Duration  (Mins) Add. Tx Parameters / Comment   []Massage - IASTM     []Massage - Scar Tissue     []Massage - Self Administered     []Massage - Therapeutic     []Myofascial Release        Comment:      Other Modalities Duration  (Mins)  Add. Tx Parameters / Comment   []Active Release     []Cupping     []Dry Needling     []Intermittent Compression      []Laser     []Lightwave     []Traction      []Other:       Comment:      THERAPEUTIC EXERCISES:    Stretching Cardio Rehab Other   []Stretching - Active []Cardio - Bike []Rehab - Ankle/Foot []Agility []PNF   []Stretching - Dynamic []Cardio - Elliptical []Rehab - Knee []Balance []ROM - Active   []Stretching - Passive []Cardio - Jog/Run []Rehab - Hip []Blood Flow Restriction []ROM - Passive   []Stretching - PNF []Cardio - Treadmill []Rehab - Wrist/Hand []Foam Roller []RTP - Concussion Protocol   []Stretching - Static []Cardio  - Upper Body Ergometer []Rehab - Elbow []Functional Exercises []RTP - Sport Specific    []Cardio - Walk []Rehab - Shoulder []Joint Mobilization []Strengthening Exercises     []Rehab - Neck/Spine []Manual Therapy []Other:     [x]Rehab - Back []Plyometric Exercises      []Rehab - Other       Comment:            Warm-Up Reps/Sets/Time Weight #                         Exercise Reps/Sets/Time Weight #   Deadbugs  3 x 10  Green theraband 3 lb dumbbell   Walking leg circles  3 x 10  Green theraband    Plank w/ knee taps  3 x 10     Lunge twist w/ OH hold  3 x 10  10 lbs    90/90 s  2 x 10     Groin rocks  2 x 10  10 lbs                          Comment:      Miscellaneous Add. Tx Parameters / Comment   []Compression Wrap    []Support Wrap    []Taping - Preventative    []Taping - Injured Part    []Wound Care    []Other:      Comment:       Assessment:     Status: AT - Cleared to Exert    Date Seen: 4/17-4/18/24    Date of Injury: Chronic- diagnosed with disk herniation in feb.     Date Out: Feb. 2024 limited     Date Cleared: March 2024      Plan:       1. Continue rehab at least 2 times a week. Athlete knows daily rehab is the best solution for his pain.   2. Physician Referral: yes  3. ED Referral:no  4. Parent/Guardian Notified: No  5. All questions were answered, ath. will contact me for questions or concerns in  the interim.  6.         Eligible to use School Insurance: No, not a school related injury

## 2024-04-30 ENCOUNTER — ATHLETIC TRAINING SESSION (OUTPATIENT)
Dept: SPORTS MEDICINE | Facility: CLINIC | Age: 19
End: 2024-04-30
Payer: COMMERCIAL

## 2024-04-30 DIAGNOSIS — M51.26 LUMBAR DISC HERNIATION: Primary | ICD-10-CM

## 2024-04-30 NOTE — PROGRESS NOTES
Reason for Encounter Follow-Up    Subjective:       Chief Complaint: Donald Grewal is a 18 y.o. male student at [employer]) who had concerns including Injury of the Lower Back.    Donald has a herniated disk of his lumbar spine. He comes in to rehab his low back and focus on core strengthening. 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     AT Session      Donald completed:    [x]  INJURY TREATMENT   []  MAINTENANCE  DATE OF SERVICE: 4/23/24  INJURY/CONDITON: Lumbar disk herniation     Donald received the selected modalities after being cleared for contradictions.  Donald received education on potenital side effects of the selected modalities and agreed to treatment.      MODALITIES:    Cryotherapy / Thermotherapy Duration  (Mins) Add. Tx Parameters / Comment   []Cold Tub / Whirlpool (50-60 F)     []Contrast Bath (105-110 F & 50-65 F)     []Game Ready     []Hot Pack     []Hot Tub / Whirlpool ( F)     []Ice Massage     []Ice Pack     []Paraffin Wax (126-130 F)     []Vapocoolant Spray        Comment:       Electrotherapy Waveform   (AC/DC) Modulation (Cont./Interrupted/Surged) Intensity   (V) Pulse Width/Dur.  (uS) Pulse Rate/Freq.  (Hz, PPS or CPS) Duration  (Mins) Add. Tx Parameters / Comment   []Combo          []E-Stim - IFC          []E-Stim - Premod          []E-Stim - Latvian          []E-Stim - TENS          []E-Stim - Other          []Iontophoresis        Meds:     Comment:      Ultrasound Duty Cycle   (%) Freq.  (Mhz) Intensity   (w/cm2) Duration  (Mins) Add. Tx Parameters / Comment   []Combo        []Phonophoresis     Meds:   []Ultrasound         []Ultrasound and E-Stim           Comment:        Massage Duration  (Mins) Add. Tx Parameters / Comment   []Massage - IASTM     []Massage - Scar Tissue     []Massage - Self Administered     []Massage - Therapeutic     []Myofascial Release        Comment:      Other Modalities Duration  (Mins)  Add. Tx Parameters / Comment   []Active Release     []Cupping     []Dry Needling     []Intermittent Compression      []Laser     []Lightwave     []Traction      []Other:       Comment:      THERAPEUTIC EXERCISES:    Stretching Cardio Rehab Other   []Stretching - Active []Cardio - Bike []Rehab - Ankle/Foot []Agility []PNF   []Stretching - Dynamic []Cardio - Elliptical []Rehab - Knee []Balance []ROM - Active   []Stretching - Passive []Cardio - Jog/Run []Rehab - Hip []Blood Flow Restriction []ROM - Passive   []Stretching - PNF []Cardio - Treadmill []Rehab - Wrist/Hand []Foam Roller []RTP - Concussion Protocol   []Stretching - Static []Cardio - Upper Body Ergometer []Rehab - Elbow []Functional Exercises []RTP - Sport Specific    []Cardio - Walk []Rehab - Shoulder []Joint Mobilization []Strengthening Exercises     []Rehab - Neck/Spine []Manual Therapy []Other:     [x]Rehab - Back []Plyometric Exercises      []Rehab - Other       Comment:            Warm-Up Reps/Sets/Time Weight #                         Exercise Reps/Sets/Time Weight #   Knee tucks with ball  3 x 10  4 lbs    Deadbugs  3 x 10  Green theraband 4 lb    Walking leg circles  3 x 10  Blue theraband    Plank w/ knee taps  3 x 10    Lunge twist w/ OH hold  3 x 10  7 lbs    90/90  2 x 10     Groin rocks  2 x 10  10 lbs                     Comment:      Miscellaneous Add. Tx Parameters / Comment   []Compression Wrap    []Support Wrap    []Taping - Preventative    []Taping - Injured Part    []Wound Care    []Other:      Comment:       Assessment:     Status: AT - Cleared to Exert    Date Seen: 4/17-4/18/24    Date of Injury: Chronic- diagnosed with disk herniation in feb.     Date Out: Feb. 2024 limited      Date Cleared: March 2024      Plan:       1. Continue rehab at least 2 times a week. Athlete knows daily rehab is the best solution for his pain.   2. Physician Referral: yes  3. ED Referral:no  4. Parent/Guardian Notified: No  5. All questions were answered, ath. will contact me for questions or concerns in  the interim.  6.         Eligible to use School Insurance: No, not a school related injury

## 2024-08-27 ENCOUNTER — ATHLETIC TRAINING SESSION (OUTPATIENT)
Dept: SPORTS MEDICINE | Facility: CLINIC | Age: 19
End: 2024-08-27
Payer: COMMERCIAL

## 2024-08-27 DIAGNOSIS — M51.26 LUMBAR DISC HERNIATION: Primary | ICD-10-CM

## 2024-08-27 NOTE — PROGRESS NOTES
Reason for Encounter Follow-Up    Subjective:       Chief Complaint: Donald Grewal is a 18 y.o. male student at [employer]) who had concerns including Injury of the Lower Back.    Donadl is a college . He has had on and off again back pain associated with a herniated disk since last year. Donald has done formal PT as well as rehab in the athletic training room. He has been fully cleared but is continuing back rehab and mobility to make sure he stays healthy.     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     AT Session          Assessment:     Status: F - Full Participation    Date Seen:  8/19/2024- 8/25/2024    Date of Injury:  Fall 2023    Date Out:  NA    Date Cleared:  NA        Treatment/Rehab/Maintenance:   Donald completed therapeutic exercises to develop flexibility:    Date: 08/22/2024    Exercises  Sets x Reps  Weight   Cat cow 2 x 10     90* ankle lift  10     90* knee lift  10     Standing hip CARs  10     Thread the needle  10                                           Donald completed therapeutic exercises to develop strength, flexibility, and core stabilization:    Date: 8/21/2024    Exercises  Sets x Reps  Weight   Standing hip CARs 3 x 10     Walking hip circles  3 x 10  Green theraband    Side plank clamshells  3 x 10  Blue theraband    Woodchoppers  2 x 10  10 lbs    Bird dogs  3 x 10  3 lbs and green theraband                                        Plan:       1. Continue daily maintenance work before practice everyday.   2. Physician Referral: yes  3. ED Referral:no  4. Parent/Guardian Notified: No  5. All questions were answered, ath. will contact me for questions or concerns in  the interim.  6.         Eligible to use School Insurance:  Yes

## 2024-08-29 ENCOUNTER — ATHLETIC TRAINING SESSION (OUTPATIENT)
Dept: SPORTS MEDICINE | Facility: CLINIC | Age: 19
End: 2024-08-29
Payer: COMMERCIAL

## 2024-08-29 DIAGNOSIS — M51.26 LUMBAR DISC HERNIATION: Primary | ICD-10-CM

## 2024-08-29 NOTE — PROGRESS NOTES
Reason for Encounter Follow-Up    Subjective:       Chief Complaint: Donald Grewal is a 18 y.o. male student at [employer]) who had concerns including Injury of the Lower Back.    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 He is doing a maintenance program for his back as well as arm care.     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     AT Session          Assessment:     Status: F - Full Participation    Date Seen: 8/19/2024- 8/25/2024    Date of Injury: Fall 2023    Date Out: NA    Date Cleared: NA            Treatment/Rehab/Maintenance:   Donald completed therapeutic exercises to develop flexibility, posture, and core stabilization:    Date: 08/30/2024    Exercises  Sets x Reps  Weight   Cat cows  10  Orange heavy resistance band   Thread the needles  10  Orange heavy resistance band    Bretzle stretch  2 x 30s     Lunge to half splits w/ twist  2 x 10     Deep squat to hamstring stretch  2 x 10  Green heavy resistance band    90* ankle lifts  2 x 10     90* knee lifts  2 x 10     Sleeper stretch  2 x 30s     Rashawn curls  3 x 10  10 lbs    Marches w/ OH press  3 x 10  7 lbs and green theraband               Donald completed therapeutic exercises to develop flexibility, posture, and core stabilization:    Date: 08/28/2024    Exercises  Sets x Reps  Weight   Cat cows  10  Orange heavy resistance band   Thread the needles  10  Orange heavy resistance band    Bretzle stretch  2 x 30s     Lunge to half splits w/ twist  2 x 10     Deep squat to hamstring stretch  2 x 10  Green heavy resistance band    90* ankle lifts  2 x 10     90* knee lifts  2 x 10     Sleeper stretch  2 x 30s     Rashawn curls  3 x 10  10 lbs    Marches w/ OH press  3 x 10  7  lbs and green theraband               Post Throw  Date: 08/27/2024    Shoulder CARs x 15   Half kneeling T spine opener x 15 each side   Tea cups x 15   Diagonal pull a parts x 15   90/90 plyo ball catches x 15   Forward arm ball catches x 15   Lateral arm ball catches x 15       Pre-Throw  Date: 08/27/2024    I, Y, T, W with J bands 1 x 15   Throwing motion with J bands 1 x 15   Reverse throws 1 x 15   Pivot pick off holds 1 x 10   Roll in holds 1 x 10   Walking wind up 1 x 10       Donald completed therapeutic exercises to develop flexibility, posture, and core stabilization:    Date: 08/27/2024    Exercises  Sets x Reps  Weight   Cat cows  10  Orange heavy resistance band   Thread the needles  10  Orange heavy resistance band    Bretzle stretch  2 x 30s     Lunge to half splits w/ twist  2 x 10     Deep squat to hamstring stretch  2 x 10  Green heavy resistance band    90* ankle lifts  2 x 10     90* knee lifts  2 x 10     Sleeper stretch  2 x 30s                          Donald completed therapeutic exercises to develop strength, flexibility, and posture:    Date: 08/26/2024    Exercises  Sets x Reps  Weight   Cat cows  10  Orange heavy resistance bands   Thread the needles  10  Orange heavy resistance bands    Bretzle stretch  2 x 30s     Deep squat to forward fold  2 x 10  Green heavy resistance band    90* ankle lifts  2 x 10     90* knee lifts  2 x 10                                    Plan:       1. Continue daily maintenance work before practice everyday.   2. Physician Referral: yes  3. ED Referral:no  4. Parent/Guardian Notified: No  5. All questions were answered, ath. will contact me for questions or concerns in  the interim.  6.         Eligible to use School Insurance: Yes

## 2024-09-06 ENCOUNTER — ATHLETIC TRAINING SESSION (OUTPATIENT)
Dept: SPORTS MEDICINE | Facility: CLINIC | Age: 19
End: 2024-09-06
Payer: COMMERCIAL

## 2024-09-06 DIAGNOSIS — M51.26 LUMBAR DISC HERNIATION: Primary | ICD-10-CM

## 2024-09-06 NOTE — PROGRESS NOTES
Reason for Encounter Follow-Up    Subjective:       Chief Complaint: Donald Grewal is a 18 y.o. male student at [employer]) who had concerns including Injury of the Lower Back.    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 He is doing a maintenance program for his back as well as arm care.     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     AT Session          Assessment:     Status: F - Full Participation    Date Seen: 9/3/2024- 9/8/2024    Date of Injury: Fall 2023    Date Out: NA    Date Cleared: NA            Treatment/Rehab/Maintenance:   Donald completed therapeutic exercises to develop flexibility, posture, and core stabilization:  (Done on his own at the gym)   Date: 09/5/2024    Exercises  Sets x Reps  Weight   Cat cows  10  Orange heavy resistance band   Thread the needles  10  Orange heavy resistance band    Bretzle stretch  2 x 30s     Lunge to half splits w/ twist  2 x 10     Deep squat to hamstring stretch  2 x 10  Green heavy resistance band    90* ankle lifts  2 x 10     90* knee lifts  2 x 10     Sleeper stretch  2 x 30s     Rashawn curls  3 x 10  10 lbs    Marches w/ OH press  3 x 10  7 lbs and green theraband               Donald completed therapeutic exercises to develop flexibility, posture, and core stabilization:    Date: 09/4/2024    Exercises  Sets x Reps  Weight   Cat cows  10  Orange heavy resistance band   Thread the needles  10  Orange heavy resistance band    Bretzle stretch  2 x 30s     Lunge to half splits w/ twist  2 x 10     Deep squat to hamstring stretch  2 x 10  Green heavy resistance band    90* ankle lifts  2 x 10     90* knee lifts  2 x 10     Sleeper stretch  2 x 30s     Rashawn curls  3 x 10  10 lbs     MarchArmetheon w/ OH press  3 x 10  7 lbs and green theraband               Post Throw  Date: 09/3/2024    Shoulder CARs x 15   Half kneeling T spine opener x 15 each side   Tea cups x 15   Diagonal pull a parts x 15   90/90 plyo ball catches x 15   Forward arm ball catches x 15   Lateral arm ball catches x 15       Pre-Throw  Date: 09/3/2024    I, Y, T, W with J bands 1 x 15   Throwing motion with J bands 1 x 15   Reverse throws 1 x 15   Pivot pick off holds 1 x 10   Roll in holds 1 x 10   Walking wind up 1 x 10     Donald completed therapeutic exercises to develop flexibility, posture, and core stabilization:    Date: 09/3/2024    Exercises  Sets x Reps  Weight   Cat cows  10  Orange heavy resistance band   Thread the needles  10  Orange heavy resistance band    Bretzle stretch  2 x 30s               90* ankle lifts  2 x 10     90* knee lifts  2 x 10     Sleeper stretch  2 x 30s     Rashawn curls  3 x 10  10 lbs    MarchArmetheon w/ OH press  3 x 10  7 lbs and green theraband             Plan:       1. Continue daily maintenance work before practice everyday.   2. Physician Referral: yes  3. ED Referral:no  4. Parent/Guardian Notified: No  5. All questions were answered, ath. will contact me for questions or concerns in  the interim.  6.         Eligible to use School Insurance: Yes

## 2024-09-13 ENCOUNTER — ATHLETIC TRAINING SESSION (OUTPATIENT)
Dept: SPORTS MEDICINE | Facility: CLINIC | Age: 19
End: 2024-09-13
Payer: COMMERCIAL

## 2024-09-13 DIAGNOSIS — M51.26 LUMBAR DISC HERNIATION: Primary | ICD-10-CM

## 2024-09-13 NOTE — PROGRESS NOTES
Reason for Encounter Follow-Up    Subjective:       Chief Complaint: Donald Grewal is a 18 y.o. male student at [employer]) who had concerns including Injury of the Lower Back.    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 He is doing a maintenance program for his back as well as arm care.     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     AT Session          Assessment:     Status: F - Full Participation    Date Seen: 9/8/2024- 9/14/2024    Date of Injury: Fall 2023    Date Out: NA    Date Cleared: NA            Treatment/Rehab/Maintenance:   Post Throw  Date: 9/14/2024    Shoulder CARs x 10  Half kneeling T spine opener x 10 each side   Tea cups x 10   Diagonal pull a parts x 10   90/90 plyo ball catches x 10   Forward arm ball catches x 10   Lateral arm ball catches x 10      Pre-Throw  Date: 9/14/2024    I, Y, T, W with J bands 1 x 5   Throwing motion with J bands 1 x 55   Reverse throws 1 x 5   Pivot pick off holds 1 x 5   Roll in holds 1 x 5   Walking wind up 1 x 5       Donald completed therapeutic exercises to develop flexibility, posture, and core stabilization:     Date: 09/13/2024    Exercises  Sets x Reps  Weight   Cat cows  10  Orange heavy resistance band   Thread the needles  3 x 10  3 lbs    Bretzle stretch  3 x 30s     Lunge to half splits w/ twist  2 x 10     Deep squat to hamstring stretch  2 x 10  Green heavy resistance band    Rashawn Curl  3 x 10  10 lbs    Airex RDL  3 x 10  10 lbs    Marches with OH hold  3 x 10  10 lbs and green theraband                         Donald completed therapeutic exercises to develop flexibility, posture, and core stabilization:    Date: 09/9/2024    Exercises  Sets x Reps  Weight   Cat cows  10   Orange heavy resistance band   Thread the needles  3 x 10  3 lbs    Lunge to half splits w/ twist  2 x 10     Deep squat to hamstring stretch  2 x 10  Green heavy resistance band    Shoulder to ground  2 x 10     Shoulder windmills  2 x 10  3 lbs                              Post Throw  Date: 9/8/2024    Shoulder CARs x 10  Half kneeling T spine opener x 10 each side   Tea cups x 10   Diagonal pull a parts x 10   90/90 plyo ball catches x 10   Forward arm ball catches x 10   Lateral arm ball catches x 10        Pre-Throw  Date: 09/8/2024    I, Y, T, W with J bands 1 x 5   Throwing motion with J bands 1 x 5   Reverse throws 1 x 5   Pivot pick off holds 1 x 5   Roll in holds 1 x 5  Walking wind up 1 x 5     Donald completed therapeutic exercises to develop flexibility, posture, and core stabilization:    Date: 09/8/2024    Exercises  Sets x Reps  Weight   Cat cows  10  Orange heavy resistance band   Rashawn Curls  10  10 lbs    Bretzle stretch  3 x 30s                                             Modality: Cupping  Date 9/8/2024, Body Location Bilateral E spine, and Duration 5 min          Plan:       1. Continue daily maintenance work before practice everyday.   2. Physician Referral: yes  3. ED Referral:no  4. Parent/Guardian Notified: No  5. All questions were answered, ath. will contact me for questions or concerns in  the interim.  6.         Eligible to use School Insurance: Yes

## 2024-09-19 ENCOUNTER — ATHLETIC TRAINING SESSION (OUTPATIENT)
Dept: SPORTS MEDICINE | Facility: CLINIC | Age: 19
End: 2024-09-19
Payer: COMMERCIAL

## 2024-09-19 DIAGNOSIS — M51.26 LUMBAR DISC HERNIATION: Primary | ICD-10-CM

## 2024-09-19 NOTE — PROGRESS NOTES
Reason for Encounter Follow-Up    Subjective:       Chief Complaint: Donald Grewal is a 18 y.o. male student at [employer]) who had concerns including Injury of the Lower Back.    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 He is doing a maintenance program for his back as well as arm care.     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     AT Session          Assessment:     Status: F - Full Participation    Date Seen: 9/15/2024- 9/21/2024    Date of Injury: Fall 2023    Date Out: NA    Date Cleared: NA            Treatment/Rehab/Maintenance:   Post Throw  Date: 9/18/2024    Shoulder CARs x 10  Half kneeling T spine opener x 10 each side   Tea cups x 10   Diagonal pull a parts x 10   90/90 plyo ball catches x 10   Forward arm ball catches x 10   Lateral arm ball catches x 10        Pre-Throw  Date: 09/18/2024    I, Y, T, W with J bands 1 x 5   Throwing motion with J bands 1 x 5   Reverse throws 1 x 5   Pivot pick off holds 1 x 5   Roll in holds 1 x 5  Walking wind up 1 x 5     Donald completed therapeutic exercises to develop ROM:    Date: 9/18/2024    Exercises  Sets x Reps  Weight   Cat cows  1 x 10  Orange heavy resistance band    Rashawn curls  2 x 10  10 lbs    Bretzle  3 x 30s     Thread the needles  2 x 10  Orange heavy resistance band    Lunge to 1/2 split with twist  2 x 10     Deep squat to hamstring stretch  2 x 10  Green heavy resistance band          9/17/2024  Ice bath: 10 min at 70*, 2 min at 55*     Donald completed therapeutic exercises to develop flexibility, posture, and core stabilization:    Date: 09/16/2024    Exercises  Sets x Reps  Weight   Cat cows  1 x 10  Orange heavy resistance band   Rashawn Curls  1 x 10  10 lbs     Bretzle stretch  1 x 30s     Deep squat to hamstring stretch  1 x 10  Green heavy resistance band                                               Plan:       1. Continue daily maintenance work before practice everyday.   2. Physician Referral: yes  3. ED Referral:no  4. Parent/Guardian Notified: No  5. All questions were answered, ath. will contact me for questions or concerns in  the interim.  6.         Eligible to use School Insurance: Yes

## 2024-09-30 ENCOUNTER — ATHLETIC TRAINING SESSION (OUTPATIENT)
Dept: SPORTS MEDICINE | Facility: CLINIC | Age: 19
End: 2024-09-30
Payer: COMMERCIAL

## 2024-09-30 DIAGNOSIS — M51.26 LUMBAR DISC HERNIATION: Primary | ICD-10-CM

## 2024-09-30 NOTE — PROGRESS NOTES
Reason for Encounter Follow-Up    Subjective:       Chief Complaint: Donald Grewal is a 18 y.o. male student at [employer]) who had concerns including Injury of the Lower Back.    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 He is doing a maintenance program for his back as well as arm care.     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     AT Session          Assessment:     Status: F - Full Participation    Date Seen: 9/22/2024- 9/28/2024    Date of Injury: Fall 2023    Date Out: NA    Date Cleared: NA            Treatment/Rehab/Maintenance:   Post Throw  Date: 9/27/2024    Shoulder CARs x 10  Half kneeling T spine opener x 10 each side   Half Kneeling T spine CARs x 10 each side   Cat cows x 10   Tea cups x 10   Diagonal pull a parts x 10   90/90 plyo ball catches x 10   Forward arm ball catches x 10   Lateral arm ball catches x 10      Pre-Throw  Date: 9/27/2024    I, Y, T, W with J bands 1 x 5   Throwing motion with J bands 1 x 55   Reverse throws 1 x 5   Pivot pick off holds 1 x 5   Roll in holds 1 x 5   Walking wind up 1 x 5       Modality: Cupping  Date 9/27/2025, Body Location Back, and Duration 5 lbs        Donald completed therapeutic exercises to develop ROM:    Date: 9/26/2024    Exercises  Sets x Reps  Weight   Cat cows  1 x 10  Orange heavy resistance band    Rashawn curls  3 x 10  10 lbs    Bretzle  3 x 30s     Thread the needles  2 x 10  Orange heavy resistance band    Lunge to 1/2 split with twist  2 x 10     Deep squat to hamstring stretch  2 x 10  Green heavy resistance band    Shoulder to ground  2 x 10     Shoulder windmills  2 x 10  3 lbs    Airex RDLs 3 x 10  10 lbs    March w/ OH hold 3 x 10  Green theraband 5 lbs           Donald completed therapeutic exercises to develop ROM:    Date: 9/25/2024    Exercises  Sets x Reps  Weight   Cat cows  1 x 10  Orange heavy resistance band    Rashawn curls  3 x 10  10 lbs    Bretzle  3 x 30s     Thread the needles  2 x 10  Orange heavy resistance band    Lunge to 1/2 split with twist  2 x 10     Deep squat to hamstring stretch  2 x 10  Green heavy resistance band    Shoulder to ground  2 x 10     Shoulder windmills  2 x 10  3 lbs    Airex RDLs 3 x 10  10 lbs    March w/ OH hold 3 x 10  Green theraband 5 lbs            Donald completed therapeutic exercises to develop flexibility, posture, and core stabilization:    Date: 09/23/2024    Exercises  Sets x Reps  Weight   Cat cows  1 x 10  Orange heavy resistance band   Rashawn Curls  1 x 10  10 lbs    Bretzle stretch  2 x 30s     Deep squat to hamstring stretch  1 x 10  Green heavy resistance band    Hip band routine  1 x 10  Green theraband                                          Plan:       1. Continue daily maintenance work before practice everyday.   2. Physician Referral: yes  3. ED Referral:no  4. Parent/Guardian Notified: No  5. All questions were answered, ath. will contact me for questions or concerns in  the interim.  6.         Eligible to use School Insurance: Yes

## 2024-10-04 ENCOUNTER — ATHLETIC TRAINING SESSION (OUTPATIENT)
Dept: SPORTS MEDICINE | Facility: CLINIC | Age: 19
End: 2024-10-04
Payer: COMMERCIAL

## 2024-10-04 DIAGNOSIS — M51.26 LUMBAR DISC HERNIATION: Primary | ICD-10-CM

## 2024-10-04 NOTE — PROGRESS NOTES
Reason for Encounter Follow-Up    Subjective:       Chief Complaint: Dnoald Grewal is a 18 y.o. male student at [employer]) who had concerns including Injury of the Lower Back.    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 He is doing a maintenance program for his back as well as arm care.     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     AT Session          Assessment:     Status: F - Full Participation    Date Seen: 9/28/2024- 10/5/2024    Date of Injury: Fall 2023    Date Out: NA    Date Cleared: NA            Treatment/Rehab/Maintenance:   Donald completed therapeutic exercises to develop ROM:    Date: 10/4/2024    Exercises  Sets x Reps  Weight   Cat cows  1 x 10  Orange heavy resistance band    Thread the needles  2 x 10  3 lbs    Lunge to half split  2 x 10     Shoulder to ground  2 x 10     Shoulder windmills  2 x 10 2 lbs    Rashawn curls  3 x 10  10 lbs    Airex RDL 3 x 10  15 lbs    Split squat with twist  2 x 10  Orange heavy resistance band    Rotating palloff press 2 x 10  Orange heavy resistance band         Modality: normatec   Date 10/4/2024, Legs, and Duration 15 lbs            Donald completed therapeutic exercises to develop ROM:    Date: 10/3/2024    Exercises  Sets x Reps  Weight   Cat cows  1 x 10  Orange heavy resistance band    Rashawn curls  2 x 10  10 lbs    Bretzle  3 x 30s     Thread the needles  2 x 10  3 lbs    Shoulder to ground  2 x 10     Deep squat to hamstring stretch  2 x 10  Green heavy resistance band                            Post Throw  Date: 10/1/2024    Shoulder CARs x 10  Half kneeling T spine opener x 10 each side   Half Kneeling T spine CARs x 10 each side   Cat cows x 10   Tea cups x 10   Diagonal  pull a parts x 10   90/90 plyo ball catches x 10   Forward arm ball catches x 10   Lateral arm ball catches x 10      Pre-Throw  Date: 10/1/2024    I, Y, T, W with J bands 1 x 5   Throwing motion with J bands 1 x 55   Reverse throws 1 x 5   Pivot pick off holds 1 x 5   Roll in holds 1 x 5   Walking wind up 1 x 5       Donald completed therapeutic exercises to develop flexibility, posture, and core stabilization:    Date: 09/30/2024    Exercises  Sets x Reps  Weight   Cat cows  1 x 10  Orange heavy resistance band   Thread the needles  2 x 10  3 lbs    Deep squat to hamstring stretch  2 x 10  Green heavy resistance band    Rashawn curls  2 x 10 10 lbs                                               Plan:       1. Continue daily maintenance work before practice everyday.   2. Physician Referral: yes  3. ED Referral:no  4. Parent/Guardian Notified: No  5. All questions were answered, ath. will contact me for questions or concerns in  the interim.  6.         Eligible to use School Insurance: Yes

## 2024-10-14 ENCOUNTER — ATHLETIC TRAINING SESSION (OUTPATIENT)
Dept: SPORTS MEDICINE | Facility: CLINIC | Age: 19
End: 2024-10-14
Payer: COMMERCIAL

## 2024-10-14 DIAGNOSIS — M51.26 LUMBAR DISC HERNIATION: Primary | ICD-10-CM

## 2024-10-14 NOTE — PROGRESS NOTES
Reason for Encounter Follow-Up    Subjective:       Chief Complaint: Donald Grewal is a 19 y.o. male student at [employer]) who had concerns including Injury of the Lower Back.    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 He is doing a maintenance program for his back as well as arm care.     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     AT Session          Assessment:     Status: F - Full Participation    Date Seen: 10/6/2024- 10/12/2024    Date of Injury: Fall 2023    Date Out: NA    Date Cleared: NA            Treatment/Rehab/Maintenance:   Donald completed therapeutic exercises to develop ROM:    Date: 10/10/2024    Exercises  Sets x Reps  Weight   Cat cows  1 x 10  Orange heavy resistance band    Rashawn curls  2 x 10  10 lbs    Bretzle  3 x 30s     Thread the needles  2 x 10  3 lbs    Shoulder to ground  2 x 10     Deep squat to hamstring stretch  2 x 10  Green heavy resistance band                            Donald completed therapeutic exercises to develop ROM:    Date: 10/8/2024    Exercises  Sets x Reps  Weight   Cat cows  1 x 10  Orange heavy resistance band    Thread the needles  2 x 10  3 lbs    Lunge to half split  2 x 10     Shoulder to ground  2 x 10     Shoulder windmills  2 x 10 2 lbs    Rashawn curls  3 x 10  10 lbs    Airex RDL 3 x 10  15 lbs    Split squat with twist  2 x 10  Orange heavy resistance band    Rotating palloff press 2 x 10  Orange heavy resistance band                   Donald completed therapeutic exercises to develop flexibility, posture, and core stabilization:    Date: 10/7/2024    Exercises  Sets x Reps  Weight   Cat cows  1 x 10  Orange heavy resistance band   Thread the needles  2 x 10  3 lbs    Deep squat  to hamstring stretch  2 x 10  Green heavy resistance band    Rashawn curls  2 x 10 10 lbs                                               Plan:       1. Continue daily maintenance work before practice everyday.   2. Physician Referral: yes  3. ED Referral:no  4. Parent/Guardian Notified: No  5. All questions were answered, ath. will contact me for questions or concerns in  the interim.  6.         Eligible to use School Insurance: Yes

## 2024-10-21 ENCOUNTER — ATHLETIC TRAINING SESSION (OUTPATIENT)
Dept: SPORTS MEDICINE | Facility: CLINIC | Age: 19
End: 2024-10-21
Payer: COMMERCIAL

## 2024-10-21 DIAGNOSIS — M51.26 LUMBAR DISC HERNIATION: Primary | ICD-10-CM

## 2024-10-21 NOTE — PROGRESS NOTES
Reason for Encounter Follow-Up    Subjective:       Chief Complaint: Donald Grewal is a 19 y.o. male student at [employer]) who had concerns including Injury of the Lower Back.    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 He is doing a maintenance program for his back as well as arm care.     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     AT Session          Assessment:     Status: F - Full Participation    Date Seen: 10/13/2024- 10/16/2024    Date of Injury: Fall 2023    Date Out: NA    Date Cleared: NA            Treatment/Rehab/Maintenance:   Donald completed therapeutic exercises to develop ROM:    Date: 10/17/2024    Exercises  Sets x Reps  Weight   Cat cows  1 x 10  Orange heavy resistance band    Thread the needles  2 x 10  3 lbs    Lunge to half split  2 x 10     Shoulder to ground  2 x 10     Shoulder windmills  2 x 10 2 lbs    Rashawn curls  3 x 10  10 lbs    Airex RDL 3 x 10  15 lbs    Split squat with twist  2 x 10  Orange heavy resistance band    Rotating palloff press 2 x 10  Orange heavy resistance band           Post Throw  Date: 10/16/2024    Shoulder CARs x 10  Half kneeling T spine opener x 10 each side   Half Kneeling T spine CARs x 10 each side   Cat cows x 10   Tea cups x 10   Diagonal pull a parts x 10   90/90 plyo ball catches x 10   Forward arm ball catches x 10   Lateral arm ball catches x 10      Pre-Throw  Date: 10/16/2024    I, Y, T, W with J bands 1 x 5   Throwing motion with J bands 1 x 55   Reverse throws 1 x 5   Pivot pick off holds 1 x 5   Roll in holds 1 x 5   Walking wind up 1 x 5         Plan:       1. Continue daily maintenance work before practice everyday.   2. Physician Referral: yes  3. ED  Referral:no  4. Parent/Guardian Notified: No  5. All questions were answered, ath. will contact me for questions or concerns in  the interim.  6.         Eligible to use School Insurance: Yes

## 2024-10-25 ENCOUNTER — ATHLETIC TRAINING SESSION (OUTPATIENT)
Dept: SPORTS MEDICINE | Facility: CLINIC | Age: 19
End: 2024-10-25
Payer: COMMERCIAL

## 2024-10-25 DIAGNOSIS — M51.26 LUMBAR DISC HERNIATION: Primary | ICD-10-CM

## 2024-10-25 NOTE — PROGRESS NOTES
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 He is doing a maintenance program for his back as well as arm care.     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     AT Session          Assessment:     Status: F - Full Participation    Date Seen: 10/20/2024- 10/26/2024    Date of Injury: Fall 2023    Date Out: NA    Date Cleared: NA            Treatment/Rehab/Maintenance:       Donald completed therapeutic exercises to develop ROM and core stabilization:    Date: 10/23/2024    Exercises  Sets x Reps  Weight   Shoulder windmills  3 x 10  2 lbs    Leg tap w/ lat pull  3 x 10  Blue theraband    Hovering bird dog  3 x 8  1 lbs    Single leg I, Y, T, W, A 3 x 10  Black theratubbing    Bridge marches  3 x 10  3 lbs    Bridge walk ins  3 x 10     Band dynamic hamstring stretch  2 x 10  Blue theraband    Cat cow  2 x 10                        Donald completed therapeutic exercises to develop ROM and core stabilization:    Date: 10/21/2024    Exercises  Sets x Reps  Weight   Shoulder windmills  3 x 10  2 lbs    Leg tap w/ lat pull  3 x 10  Blue theraband    Hovering bird dog  3 x 8  1 lbs    Single leg I, Y, T, W, A 3 x 10  Black theratubbing    Bridge marches  3 x 10  3 lbs    Bridge walk ins  3 x 10     Band dynamic hamstring stretch  2 x 10  Blue theraband    Cat cow  2 x 10     90/90 w/ reach  2 x 10     Hip airplanes 2 x 10                Plan:       1. Continue daily maintenance work.   2. Physician Referral: yes  3. ED Referral:no  4. Parent/Guardian Notified: No  5. All questions  were answered, ath. will contact me for questions or concerns in  the interim.  6.         Eligible to use School Insurance: Yes

## 2024-11-02 ENCOUNTER — ATHLETIC TRAINING SESSION (OUTPATIENT)
Dept: SPORTS MEDICINE | Facility: CLINIC | Age: 19
End: 2024-11-02
Payer: COMMERCIAL

## 2024-11-02 DIAGNOSIS — Z00.00 HEALTH CARE MAINTENANCE: Primary | ICD-10-CM

## 2024-11-11 ENCOUNTER — OFFICE VISIT (OUTPATIENT)
Dept: SPORTS MEDICINE | Facility: CLINIC | Age: 19
End: 2024-11-11
Payer: COMMERCIAL

## 2024-11-11 ENCOUNTER — HOSPITAL ENCOUNTER (EMERGENCY)
Facility: HOSPITAL | Age: 19
Discharge: HOME | End: 2024-11-11
Attending: EMERGENCY MEDICINE
Payer: COMMERCIAL

## 2024-11-11 VITALS
HEART RATE: 63 BPM | HEIGHT: 72 IN | BODY MASS INDEX: 28.44 KG/M2 | TEMPERATURE: 98 F | SYSTOLIC BLOOD PRESSURE: 144 MMHG | WEIGHT: 210 LBS | OXYGEN SATURATION: 98 % | RESPIRATION RATE: 16 BRPM | DIASTOLIC BLOOD PRESSURE: 71 MMHG

## 2024-11-11 VITALS
HEART RATE: 85 BPM | BODY MASS INDEX: 30.54 KG/M2 | HEIGHT: 70 IN | WEIGHT: 213.31 LBS | DIASTOLIC BLOOD PRESSURE: 72 MMHG | SYSTOLIC BLOOD PRESSURE: 104 MMHG

## 2024-11-11 DIAGNOSIS — S61.315A LACERATION OF LEFT RING FINGER WITHOUT FOREIGN BODY WITH DAMAGE TO NAIL, INITIAL ENCOUNTER: Primary | ICD-10-CM

## 2024-11-11 DIAGNOSIS — S69.92XA FINGER INJURY, LEFT, INITIAL ENCOUNTER: Primary | ICD-10-CM

## 2024-11-11 DIAGNOSIS — S61.315A LACERATION OF LEFT RING FINGER WITHOUT FOREIGN BODY WITH DAMAGE TO NAIL, INITIAL ENCOUNTER: ICD-10-CM

## 2024-11-11 DIAGNOSIS — S62.639B OPEN FRACTURE OF TUFT OF DISTAL PHALANX OF FINGER: ICD-10-CM

## 2024-11-11 PROCEDURE — 99499 UNLISTED E&M SERVICE: CPT | Mod: S$GLB,,, | Performed by: ORTHOPAEDIC SURGERY

## 2024-11-11 PROCEDURE — 99999 PR PBB SHADOW E&M-EST. PATIENT-LVL III: CPT | Mod: PBBFAC,,, | Performed by: ORTHOPAEDIC SURGERY

## 2024-11-11 PROCEDURE — 63600175 PHARM REV CODE 636 W HCPCS

## 2024-11-11 PROCEDURE — 25000003 PHARM REV CODE 250

## 2024-11-11 PROCEDURE — 13132 CMPLX RPR F/C/C/M/N/AX/G/H/F: CPT

## 2024-11-11 PROCEDURE — 90715 TDAP VACCINE 7 YRS/> IM: CPT

## 2024-11-11 PROCEDURE — 99284 EMERGENCY DEPT VISIT MOD MDM: CPT | Mod: 25

## 2024-11-11 PROCEDURE — 90471 IMMUNIZATION ADMIN: CPT

## 2024-11-11 RX ORDER — CEPHALEXIN 500 MG/1
500 CAPSULE ORAL 4 TIMES DAILY
Qty: 20 CAPSULE | Refills: 0 | Status: SHIPPED | OUTPATIENT
Start: 2024-11-11 | End: 2024-11-16

## 2024-11-11 RX ORDER — SULFAMETHOXAZOLE AND TRIMETHOPRIM 800; 160 MG/1; MG/1
1 TABLET ORAL 2 TIMES DAILY
Qty: 14 TABLET | Refills: 0 | Status: SHIPPED | OUTPATIENT
Start: 2024-11-11 | End: 2024-11-18

## 2024-11-11 RX ORDER — ONDANSETRON 4 MG/1
4 TABLET, ORALLY DISINTEGRATING ORAL
Status: COMPLETED | OUTPATIENT
Start: 2024-11-11 | End: 2024-11-11

## 2024-11-11 RX ORDER — OXYCODONE HYDROCHLORIDE 5 MG/1
5 TABLET ORAL EVERY 4 HOURS PRN
Qty: 10 TABLET | Refills: 0 | Status: SHIPPED | OUTPATIENT
Start: 2024-11-11 | End: 2024-11-14

## 2024-11-11 RX ORDER — OXYCODONE HYDROCHLORIDE 5 MG/1
5 TABLET ORAL
Status: COMPLETED | OUTPATIENT
Start: 2024-11-11 | End: 2024-11-11

## 2024-11-11 RX ORDER — NAPROXEN 500 MG/1
500 TABLET ORAL 2 TIMES DAILY WITH MEALS
Qty: 14 TABLET | Refills: 0 | Status: SHIPPED | OUTPATIENT
Start: 2024-11-11 | End: 2024-11-18

## 2024-11-11 RX ORDER — LIDOCAINE HYDROCHLORIDE 10 MG/ML
5 INJECTION, SOLUTION EPIDURAL; INFILTRATION; INTRACAUDAL; PERINEURAL
Status: COMPLETED | OUTPATIENT
Start: 2024-11-11 | End: 2024-11-11

## 2024-11-11 RX ORDER — BACITRACIN ZINC 500 [USP'U]/G
OINTMENT TOPICAL
Status: COMPLETED | OUTPATIENT
Start: 2024-11-11 | End: 2024-11-11

## 2024-11-11 RX ORDER — SULFAMETHOXAZOLE AND TRIMETHOPRIM 800; 160 MG/1; MG/1
1 TABLET ORAL 2 TIMES DAILY
Qty: 14 TABLET | Refills: 0 | Status: SHIPPED | OUTPATIENT
Start: 2024-11-11 | End: 2024-11-11

## 2024-11-11 RX ADMIN — OXYCODONE 5 MG: 5 TABLET ORAL at 06:11

## 2024-11-11 RX ADMIN — LIDOCAINE HYDROCHLORIDE 50 MG: 10 SOLUTION INTRAVENOUS at 07:11

## 2024-11-11 RX ADMIN — BACITRACIN 1 EACH: 500 OINTMENT TOPICAL at 10:11

## 2024-11-11 RX ADMIN — TETANUS TOXOID, REDUCED DIPHTHERIA TOXOID AND ACELLULAR PERTUSSIS VACCINE, ADSORBED 0.5 ML: 5; 2.5; 8; 8; 2.5 SUSPENSION INTRAMUSCULAR at 05:11

## 2024-11-11 RX ADMIN — OXYCODONE 5 MG: 5 TABLET ORAL at 09:11

## 2024-11-11 RX ADMIN — ONDANSETRON 4 MG: 4 TABLET, ORALLY DISINTEGRATING ORAL at 09:11

## 2024-11-11 NOTE — ED NOTES
Patient identifiers verified and correct for Donald Grewal  LOC: The patient is awake, alert and aware of environment with an appropriate affect, the patient is oriented x 3 and speaking appropriately.   APPEARANCE: Patient appears comfortable and in no acute distress, patient is clean and well groomed.  SKIN: The skin is warm and dry, color consistent with ethnicity, patient has normal skin turgor and moist mucus membranes, skin intact, no breakdown or bruising noted.   MUSCULOSKELETAL: Patient moving all extremities spontaneously, no swelling noted. Pt has injury to left ring finger after smashing it between weights at the gym  RESPIRATORY: Airway is open and patent, respirations are spontaneous, patient has a normal effort and rate, no accessory muscle use noted, O2 Sat 97% on room air.  CARDIAC: Patient has a normal rate and regular rhythm, no edema noted, capillary refill < 3 seconds.   GASTRO: Soft and non tender to palpation, no distention noted, Pt states bowel movements have been regular.  : Pt denies any pain or frequency with urination.  NEURO: Pt opens eyes spontaneously, behavior appropriate to situation, follows commands, facial expression symmetrical, bilateral hand grasp equal and even, purposeful motor response noted, normal sensation in all extremities when touched with a finger.

## 2024-11-11 NOTE — PROGRESS NOTES
CC: Finger laceration      19 y.o. Male presents today for initial evaluation of his finger laceration. 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 He is here in hopes that we'd be able to repair the laceration.    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 No pertinent surgical history.    FAMILY HISTORY:  No family history on file.    SOCIAL HISTORY:  Social History     Socioeconomic History    Marital status: Single   Tobacco Use    Smoking status: Never    Smokeless tobacco: Never   Substance and Sexual Activity    Alcohol use: No    Drug use: No    Sexual activity: Never   Social History Narrative    11th grader    Lives with parents    2 siblings     Plays football and baseball      Social Drivers of Health     Financial Resource Strain: Low Risk  (2/19/2024)    Overall Financial Resource Strain (CARDIA)     Difficulty of Paying Living Expenses: Not very hard   Food Insecurity: No Food Insecurity (2/19/2024)    Hunger Vital Sign     Worried About Running Out of Food in the Last Year: Never true     Ran Out of Food in the Last Year: Never true   Transportation Needs: No Transportation Needs (2/19/2024)    PRAPARE - Transportation     Lack of Transportation (Medical): No     Lack of Transportation (Non-Medical): No   Physical Activity: Sufficiently Active (2/19/2024)    Exercise Vital Sign     Days of Exercise per Week: 5 days     Minutes of Exercise per Session: 60 min   Stress: No Stress Concern Present (2/19/2024)    [employer] [place] [employer] - Occupational Stress Questionnaire     Feeling of Stress : Not at all   Housing Stability: Low Risk  (2/19/2024)    Housing Stability Vital Sign     Unable to Pay for Housing in the Last Year: No     Number of Places  "Lived in the Last Year: 2     Unstable Housing in the Last Year: No       MEDICATIONS:     Current Outpatient Medications:     budesonide/formoterol fumarate (SYMBICORT INHL), Inhale into the lungs., Disp: , Rfl:     celecoxib (CELEBREX) 200 MG capsule, Take 1 capsule (200 mg total) by mouth 2 (two) times daily., Disp: 30 capsule, Rfl: 2    ibuprofen (ADVIL,MOTRIN) 100 mg/5 mL suspension, Take by mouth every 6 (six) hours as needed for Temperature greater than., Disp: , Rfl:     cephALEXin (KEFLEX) 500 MG capsule, Take 1 capsule (500 mg total) by mouth 4 (four) times daily. for 5 days, Disp: 20 capsule, Rfl: 0    montelukast sodium (SINGULAIR ORAL), Take by mouth. (Patient not taking: Reported on 11/11/2024), Disp: , Rfl:     naproxen (NAPROSYN) 500 MG tablet, Take 1 tablet (500 mg total) by mouth 2 (two) times daily with meals. for 7 days, Disp: 14 tablet, Rfl: 0    oxyCODONE (ROXICODONE) 5 MG immediate release tablet, Take 1 tablet (5 mg total) by mouth every 4 (four) hours as needed (severe pain)., Disp: 10 tablet, Rfl: 0    sulfamethoxazole-trimethoprim 800-160mg (BACTRIM DS) 800-160 mg Tab, Take 1 tablet by mouth 2 (two) times daily. for 7 days, Disp: 14 tablet, Rfl: 0    ALLERGIES:   Review of patient's allergies indicates:   Allergen Reactions    Latex, natural rubber Hives        PHYSICAL EXAMINATION:  /72   Pulse 85   Ht 5' 10" (1.778 m)   Wt 96.7 kg (213 lb 4.7 oz)   BMI 30.60 kg/m²   Vitals signs and nursing note have been reviewed.  General: In no acute distress, well developed, well nourished, no diaphoresis  Eyes: EOM full and smooth, no eye redness or discharge  HENT: normocephalic and atraumatic, neck supple, trachea midline, no nasal discharge  Cardiovascular: no LE edema  Lungs: respirations non-labored, no conversational dyspnea   Neuro: AAOx3, CN2-12 grossly intact  Skin: No rashes, warm and dry  Psychiatric: cooperative, pleasant, mood and affect appropriate for age    Finger: left "   The affected Finger is compared to the contralateral Finger.    Observation:  No evidence of erythema, effusion.  No asymmetries; muscle atrophy; ganglion cysts; or bony abnormalities including ulnar deviation,   No Heberdens or Brouchards nodes,   No swan-neck or boutonnière deformities.    No clubbing of the digits.  + laceration over the distal phalanx of the left 4th digit with presence of swelling and subungual hematoma. Nail bed involvement around the laceration.    Neurovascular Exam:  Sensation intact to light touch in the median, ulnar, and radial distributions on the hands bilaterally.  Radial and ulnar pulses intact and symmetric.  Capillary refill intact to <2 seconds in all digits.      ASSESSMENT:      ICD-10-CM ICD-9-CM   1. Finger injury, left, initial encounter  S69.92XA 959.5   2. Laceration of left ring finger without foreign body with damage to nail, initial encounter  S61.315A 883.0         PLAN:  1-2. 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     - Advised for him to seek care from the ED as more specialized suture material and cleaning will be necessary to safely address the laceration, likely fracture.    - His  was on the phone during the visit and expressed understanding and agreement to the plan.       Future planning includes - next steps per the ED    All questions were answered to the best of my ability and all concerns were addressed at this time.    Follow up in the ED.      This note is dictated using the M*Modal Fluency Direct word recognition program. There are word recognition mistakes that are occasionally missed on review.

## 2024-11-11 NOTE — FIRST PROVIDER EVALUATION
Medical screening examination initiated.  I have conducted a focused provider triage encounter, findings are as follows:    Brief history of present illness:  Injured his left ring finger just prior to arrival.    There were no vitals filed for this visit.    Pertinent physical exam:  finger laceration            Brief workup plan:  xray    Preliminary workup initiated; this workup will be continued and followed by the physician or advanced practice provider that is assigned to the patient when roomed.

## 2024-11-12 NOTE — ED PROVIDER NOTES
Encounter Date: 11/11/2024       History     Chief Complaint   Patient presents with    Finger Injury     Smashed L ring finger between weights     Pt is a 19 year old male with no significant PMHx who presents for evaluation of left 4th finger injury which occurred today. Pt reprots he accidentally dropped a weight bar on his finger. He denies any other injury. No falls or head trauma. He has not taken any medications for pain.     The history is provided by the patient.     Review of patient's allergies indicates:   Allergen Reactions    Latex, natural rubber Hives     Past Medical History:   Diagnosis Date    Asthma      History reviewed. No pertinent surgical history.  No family history on file.  Social History     Tobacco Use    Smoking status: Never    Smokeless tobacco: Never   Substance Use Topics    Alcohol use: No    Drug use: No     Review of Systems    Physical Exam     Initial Vitals [11/11/24 1641]   BP Pulse Resp Temp SpO2   130/79 69 20 98.4 °F (36.9 °C) 100 %      MAP       --         Physical Exam    Nursing note and vitals reviewed.  Constitutional: He appears well-developed and well-nourished.   HENT:   Head: Normocephalic and atraumatic.   Eyes: EOM are normal. Pupils are equal, round, and reactive to light.   Neck:   Normal range of motion.  Cardiovascular:  Normal rate and regular rhythm.           Pulmonary/Chest: No respiratory distress.   Musculoskeletal:         General: Normal range of motion.      Cervical back: Normal range of motion.      Comments: Left wrist without tenderness to palpation. Left 4th finger with laceration and tenderness to palpation. Sensation intact. FROM to the joints of the 4th digit      Neurological: He is alert and oriented to person, place, and time.   Skin: Skin is warm and dry. Capillary refill takes less than 2 seconds.       ED Course   Lac Repair    Date/Time: 11/11/2024 11:54 PM    Performed by: Brooks Baldwin Jr., MD  Authorized by: Amadou  MD Chandana    Consent:     Consent obtained:  Verbal    Consent given by:  Patient  Universal protocol:     Patient identity confirmed:  Verbally with patient and arm band  Anesthesia:     Anesthesia method:  Nerve block    Block location:  Ring finger    Block needle gauge:  25 G    Block anesthetic:  Lidocaine 1% w/o epi    Block injection procedure:  Anatomic landmarks identified, negative aspiration for blood and incremental injection    Block outcome:  Anesthesia achieved  Laceration details:     Location:  Finger    Finger location:  L ring finger    Length (cm):  4  Treatment:     Irrigation solution:  Sterile saline    Irrigation volume:  1.5 L    Irrigation method:  Pressure wash    Visualized foreign bodies/material removed: no      Debridement:  None  Skin repair:     Repair method:  Sutures    Suture size:  3-0 and 5-0    Suture material:  Chromic gut    Suture technique:  Simple interrupted    Number of sutures:  12  Approximation:     Approximation:  Close  Repair type:     Repair type:  Complex (Had to remove fingernail and perform nailbed repair as well, then splint nailbed open with thin, appropriately shaped metal suture packaging)  Post-procedure details:     Dressing:  Antibiotic ointment    Procedure completion:  Tolerated  Splint Application    Date/Time: 2024 11:57 PM    Performed by: Brooks Baldwin Jr., MD  Authorized by: Chandana Tobar MD  Consent Done: Yes  Consent: Verbal consent obtained.  Consent given by: patient  Patient identity confirmed:  and name  Location details: left ring finger  Splint type: static finger  Supplies used: cotton padding  Post-procedure: The splinted body part was neurovascularly unchanged following the procedure.  Patient tolerance: Patient tolerated the procedure well with no immediate complications      Labs Reviewed - No data to display       Imaging Results              X-Ray Finger 2 or More Views Left (Final result)  Result time 24  19:18:37      Final result by Chacorta Mays MD (11/11/24 19:18:37)                   Impression:      1. Fracture of the distal 4th digit as described.      Electronically signed by: Chacorta Mays MD  Date:    11/11/2024  Time:    19:18               Narrative:    EXAMINATION:  XR FINGER 2 OR MORE VIEWS LEFT    CLINICAL HISTORY:  finger injury;    COMPARISON:  None    FINDINGS:  Three views left 4th digit.    There is question IP injury involving the distal aspect of the distal phalanx of the 4th digit.  There is associated soft tissue injury, concerning for open wound/nailbed injury.  No radiopaque foreign body.  There is edema about the site.                                       Medications   Tdap (BOOSTRIX) vaccine injection 0.5 mL (0.5 mLs Intramuscular Given 11/11/24 1758)   oxyCODONE immediate release tablet 5 mg (5 mg Oral Given 11/11/24 1801)   LIDOcaine (PF) 10 mg/ml (1%) injection 50 mg (50 mg Infiltration Given 11/11/24 1930)   oxyCODONE immediate release tablet 5 mg (5 mg Oral Given 11/11/24 2113)   ondansetron disintegrating tablet 4 mg (4 mg Oral Given 11/11/24 2125)   bacitracin zinc ointment (1 each Topical (Top) Given 11/11/24 2210)     Medical Decision Making  Pt presents for finger injury. Ddx: fx, dislocation, laceration. Pt with open tuft fracture on x-ray with laceration involving the nailbed. Pt neurovascularly intact with FROM to joints of left ring finger. Digital block performed and laceration repaired. Nail was removed for laceration repair and nailbed splinted open with suture packaging.  Finger splinted. Pt stable to discharge to home with hand surgery follow up and course of antibiotics     Amount and/or Complexity of Data Reviewed  Radiology: ordered and independent interpretation performed.     Details: Distal 4th phalanx fracture.    Risk  OTC drugs.  Prescription drug management.              Attending Attestation:   Physician Attestation Statement for Resident:  As the  supervising MD   Physician Attestation Statement: I have personally seen and examined this patient.   I agree with the above history.  -:   As the supervising MD I agree with the above PE.     As the supervising MD I agree with the above treatment, course, plan, and disposition.   I was personally present during the entire procedure.  I have reviewed and agree with the residents interpretation of the following: x-rays.                                      Clinical Impression:  Final diagnoses:  [S61.432A] Laceration of left ring finger without foreign body with damage to nail, initial encounter (Primary)  [T97.780I] Open fracture of tuft of distal phalanx of finger          ED Disposition Condition    Discharge Stable          ED Prescriptions       Medication Sig Dispense Start Date End Date Auth. Provider    naproxen (NAPROSYN) 500 MG tablet Take 1 tablet (500 mg total) by mouth 2 (two) times daily with meals. for 7 days 14 tablet 11/11/2024 11/18/2024 Brooks Baldwin Jr., MD    sulfamethoxazole-trimethoprim 800-160mg (BACTRIM DS) 800-160 mg Tab  (Status: Discontinued) Take 1 tablet by mouth 2 (two) times daily. for 7 days 14 tablet 11/11/2024 11/11/2024 Brooks Baldwin Jr., MD    sulfamethoxazole-trimethoprim 800-160mg (BACTRIM DS) 800-160 mg Tab Take 1 tablet by mouth 2 (two) times daily. for 7 days 14 tablet 11/11/2024 11/18/2024 Brooks Baldwin Jr., MD    cephALEXin (KEFLEX) 500 MG capsule Take 1 capsule (500 mg total) by mouth 4 (four) times daily. for 5 days 20 capsule 11/11/2024 11/16/2024 Brooks Baldwin Jr., MD    oxyCODONE (ROXICODONE) 5 MG immediate release tablet Take 1 tablet (5 mg total) by mouth every 4 (four) hours as needed (severe pain). 10 tablet 11/11/2024 11/14/2024 Chandana Tobar MD          Follow-up Information       Follow up With Specialties Details Why Contact Info    Chaya Fitzgerald NP Pediatrics In 1 week  [address] [facility] [id number] [phone]                       Brooks Baldwin Jr., MD  Resident  11/12/24 0000       Chandana Tobar MD  11/12/24 1456

## 2024-11-12 NOTE — DISCHARGE INSTRUCTIONS
Follow up with your primary care doctor. We will send referrals to hand surgery who you should follow up with. We will also prescribe you with antibiotics which you should take. Return to the ED for new or worsening symptoms.

## 2024-11-14 ENCOUNTER — OFFICE VISIT (OUTPATIENT)
Dept: ORTHOPEDICS | Facility: CLINIC | Age: 19
End: 2024-11-14
Payer: COMMERCIAL

## 2024-11-14 VITALS — BODY MASS INDEX: 28.46 KG/M2 | WEIGHT: 210.13 LBS | HEIGHT: 72 IN

## 2024-11-14 DIAGNOSIS — S62.635A CLOSED FRACTURE OF TUFT OF DISTAL PHALANX OF LEFT RING FINGER: Primary | ICD-10-CM

## 2024-11-14 PROCEDURE — 99203 OFFICE O/P NEW LOW 30 MIN: CPT | Mod: S$GLB,,, | Performed by: ORTHOPAEDIC SURGERY

## 2024-11-14 PROCEDURE — 1159F MED LIST DOCD IN RCRD: CPT | Mod: CPTII,S$GLB,, | Performed by: ORTHOPAEDIC SURGERY

## 2024-11-14 PROCEDURE — 3008F BODY MASS INDEX DOCD: CPT | Mod: CPTII,S$GLB,, | Performed by: ORTHOPAEDIC SURGERY

## 2024-11-14 PROCEDURE — 99999 PR PBB SHADOW E&M-EST. PATIENT-LVL III: CPT | Mod: PBBFAC,,, | Performed by: ORTHOPAEDIC SURGERY

## 2024-11-14 NOTE — PROGRESS NOTES
[facility]  History & Physical  Orthopedics    SUBJECTIVE:      History of Present Illness    CHIEF COMPLAINT:  - Donald who is right-handed presents today for follow-up evaluation of a left ring finger injury sustained while weightlifting earlier this week.    HPI:  Donald presents with an injury to his left ring finger, which occurred on Monday when he dropped a weightlifting bar on his finger, resulting in a smashed and split finger. He immediately went to the ER at the main campus, where the wound was sutured and the entire fingernail removed. Donald reports that the injury caused significant damage, and there is mention of a small fracture in the bone. He has been prescribed several medications, including cephalexin (4 times daily), naproxen (twice daily), Bactrim (twice daily), and oxycodone as needed. Donald's , Rogelio, has been cleaning the wound and changing the dressing. The dressing tends to get wet and sweaty during workouts and showers, despite attempts to keep it dry. Donald worked out on Tuesday, focusing only on his right side. 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  Review of patient's allergies indicates:   Allergen Reactions    Latex, natural rubber Hives     Social History     Social History Narrative    11th grader    Lives with parents    2 siblings     Plays football  and baseball      No family history on file.      Current Outpatient Medications:     budesonide/formoterol fumarate (SYMBICORT INHL), Inhale into the lungs., Disp: , Rfl:     celecoxib (CELEBREX) 200 MG capsule, Take 1 capsule (200 mg total) by mouth 2 (two) times daily., Disp: 30 capsule, Rfl: 2    cephALEXin (KEFLEX) 500 MG capsule, Take 1 capsule (500 mg total) by mouth 4 (four) times daily. for 5 days, Disp: 20 capsule, Rfl: 0    ibuprofen (ADVIL,MOTRIN) 100 mg/5 mL suspension, Take by mouth every 6 (six) hours as needed for Temperature greater than., Disp: , Rfl:     montelukast sodium (SINGULAIR ORAL), Take by mouth. (Patient not taking: Reported on 11/11/2024), Disp: , Rfl:     naproxen (NAPROSYN) 500 MG tablet, Take 1 tablet (500 mg total) by mouth 2 (two) times daily with meals. for 7 days, Disp: 14 tablet, Rfl: 0    oxyCODONE (ROXICODONE) 5 MG immediate release tablet, Take 1 tablet (5 mg total) by mouth every 4 (four) hours as needed (severe pain)., Disp: 10 tablet, Rfl: 0    sulfamethoxazole-trimethoprim 800-160mg (BACTRIM DS) 800-160 mg Tab, Take 1 tablet by mouth 2 (two) times daily. for 7 days, Disp: 14 tablet, Rfl: 0    OBJECTIVE:      Vital Signs (Most Recent):  Vitals:    11/14/24 1345   Weight: 95.3 kg (210 lb 1.6 oz)   Height: 6' (1.829 m)     Body mass index is 28.49 kg/m².    Physical Exam    Musculoskeletal: Normal tendon function.  Skin: Fingernail removed.         Left Hand/Wrist Examination:    Observation/Inspection:  Swelling   mild    Deformity  none  Discoloration   Ecchymosis left ring fingertip     Scars   none    Atrophy  None   Left ring finger soft tissue injury status post reapproximation in the ED with chromic foil stenting nail fold open      Neurovascular Exam:  Digits WWP, brisk CR < 3s throughout  NVI motor/LTS to M/R/U nerves, radial pulse 2+  ROM hand full, painless except the left ring finger which is painful with range motion although FDP is intact by exam    ROM wrist  full, painless    ROM elbow full, painless    Abdomen not guarded  Respirations nonlabored  Perfusion intact    Diagnostic Results:     Imaging - I independently viewed the patient's imaging as well as the radiology report.  Xrays of the patient's  left ring finger  demonstrate no evidence of any acute fractures or dislocations or significant degenerative changes.    EMG -  none    ASSESSMENT/PLAN:      19 y.o. yo male with  left ring finger tuft fracture status post repair in the ED  Plan: The patient and I had a thorough discussion today.  We discussed the working diagnosis as well as several other potential alternative diagnoses.  Treatment options were discussed, both conservative and surgical.  Conservative treatment options would include things such as activity modifications, workplace modifications, a period of rest, oral vs topical OTC and prescription anti-inflammatory medications, occupational therapy, splinting/bracing, immobilization, corticosteroid injections, and others.  Surgical options were discussed as well.     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    MEDICATIONS PRESCRIBED:  -  continue antibiotics for infection prophylaxis    RETURN TO ACTIVITY:  - Advise patient to avoid sweating and weightlifting until the next appointment. Recommend taking quicker showers to minimize moisture exposure to the injured finger.    PROCEDURES:  - Remove dressing from patient's injured left ring finger. Examine patient's finger, including checking tendon function. Apply new soft dressing to the injured finger. Gentle range of motion is as tolerated at this time.  No splint.    FOLLOW UP:  - Schedule follow-up visit for Monday or Tuesday of the following week. Assess injury and remove metal piece  from finger during next appointment.         Should the patient's symptoms worsen, persist, or fail to improve they should return for reevaluation and I would be happy to see them back anytime.        Darrius Swain M.D.    Please be aware that this note has been generated with the assistance of [employer] voice-to-text.  Please excuse any spelling or grammatical errors.    Thank you for choosing Dr. Darrius Swain for your orthopedic hand and upper extremity care. It is our goal to provide you with exceptional care that will help keep you healthy, active, and get you back in the game.     If you felt that you received exemplary care today, please consider leaving feedback for Dr. Swain on [employer] at [email or web address].    Please do not hesitate to reach out to us via email, phone, or MyChart with any questions, concerns, or feedback.

## 2024-11-19 ENCOUNTER — OFFICE VISIT (OUTPATIENT)
Dept: ORTHOPEDICS | Facility: CLINIC | Age: 19
End: 2024-11-19
Payer: COMMERCIAL

## 2024-11-19 DIAGNOSIS — S62.635A CLOSED FRACTURE OF TUFT OF DISTAL PHALANX OF LEFT RING FINGER: Primary | ICD-10-CM

## 2024-11-19 PROCEDURE — 99999 PR PBB SHADOW E&M-EST. PATIENT-LVL III: CPT | Mod: PBBFAC,,, | Performed by: ORTHOPAEDIC SURGERY

## 2024-11-19 PROCEDURE — 1159F MED LIST DOCD IN RCRD: CPT | Mod: CPTII,S$GLB,, | Performed by: ORTHOPAEDIC SURGERY

## 2024-11-19 PROCEDURE — 99213 OFFICE O/P EST LOW 20 MIN: CPT | Mod: S$GLB,,, | Performed by: ORTHOPAEDIC SURGERY

## 2024-11-19 RX ORDER — SULFAMETHOXAZOLE AND TRIMETHOPRIM 800; 160 MG/1; MG/1
1 TABLET ORAL 2 TIMES DAILY
Qty: 28 TABLET | Refills: 0 | Status: SHIPPED | OUTPATIENT
Start: 2024-11-19 | End: 2024-12-03

## 2024-11-19 NOTE — PROGRESS NOTES
[facility]  History & Physical  Orthopedics    SUBJECTIVE:      History of Present Illness      CHIEF COMPLAINT:  - Donald who is right-handed presents today for follow-up evaluation of a left ring finger injury sustained while weightlifting on 11/11/24.     HPI:  Donald presents with an injury to his left ring finger, which occurred on Monday when he dropped a weightlifting bar on his finger, resulting in a smashed and split finger. He immediately went to the ER at the main campus, where the wound was sutured and the entire fingernail removed. Donald reports that the injury caused significant damage, and there is mention of a small fracture in the bone. He has been prescribed several medications, including cephalexin (4 times daily), naproxen (twice daily), Bactrim (twice daily), and oxycodone as needed. Donald's , Rogelio, has been cleaning the wound and changing the dressing. The dressing tends to get wet and sweaty during workouts and showers, despite attempts to keep it dry. Donald worked out on Tuesday, focusing only on his right side. 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    Interval history 11/19/2024:  Donald presents today for follow-up of left ring finger chris fracture with laceration and nail bed removal in the ED. Patient is doing well today. He has stopped weight lifting, as instructed, and has been taking care to keep his finger and dressing dry. Patient reports pain improved. Denies paresthesias, numbness, or weakness. He reports some stiffness in his finger, which has improved.      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  Review of patient's allergies indicates:   Allergen Reactions    Latex, natural rubber Hives     Social History     Social History Narrative    11th grader    Lives with parents    2 siblings     Plays football and baseball      No family history on file.      Current Outpatient Medications:     budesonide/formoterol fumarate (SYMBICORT INHL), Inhale into the lungs., Disp: , Rfl:     celecoxib (CELEBREX) 200 MG capsule, Take 1 capsule (200 mg total) by mouth 2 (two) times daily., Disp: 30 capsule, Rfl: 2    ibuprofen (ADVIL,MOTRIN) 100 mg/5 mL suspension, Take by mouth every 6 (six) hours as needed for Temperature greater than., Disp: , Rfl:     montelukast sodium (SINGULAIR ORAL), Take by mouth. (Patient not taking: Reported on 11/11/2024), Disp: , Rfl:     OBJECTIVE:      Vital Signs (Most Recent):  There were no vitals filed for this visit.    There is no height or weight on file to calculate BMI.    Physical Exam              Left Hand/Wrist Examination:    Observation/Inspection:  Swelling   mild    Deformity  none  Discoloration   Ecchymosis left ring fingertip     Scars   none    Atrophy  None   Left ring finger soft tissue injury status post reapproximation in the ED with chromic foil stenting nail fold open      Neurovascular Exam:  Digits WWP, brisk CR < 3s throughout  NVI motor/LTS to M/R/U nerves, radial pulse 2+  ROM hand full, painless except the left ring finger which is painful with range motion although FDP is intact by exam    ROM wrist full, painless    ROM elbow full, painless    Abdomen not guarded  Respirations nonlabored  Perfusion intact    Diagnostic Results:     Imaging - I independently viewed the patient's imaging as well as the radiology report.  Xrays of the patient's   left ring finger  demonstrate no evidence of any acute fractures or dislocations or significant degenerative changes.    EMG -  none    ASSESSMENT/PLAN:      19 y.o. yo male with  left ring finger tuft fracture status post repair in the ED  Plan: The patient and I had a thorough discussion today.  We discussed the working diagnosis as well as several other potential alternative diagnoses.  Treatment options were discussed, both conservative and surgical.  Conservative treatment options would include things such as activity modifications, workplace modifications, a period of rest, oral vs topical OTC and prescription anti-inflammatory medications, occupational therapy, splinting/bracing, immobilization, corticosteroid injections, and others.  Surgical options were discussed as well.     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 Keep dry and clean band-aid on finger. Do not scrub finger.     MEDICATIONS PRESCRIBED:  -  continue antibiotics for infection prophylaxis    RETURN TO ACTIVITY:  - Advise patient to avoid weightlifting with left hand until the next appointment. Recommend washing L RF with soap and water and replacing band-aid if gets sweaty.     PROCEDURES:  - Removed dressing and foil from patient's injured left ring finger. Examine patient's finger, including checking tendon function. Apply new soft dressing to the injured finger. Gentle range of motion is as tolerated at this time.  No splint.    FOLLOW UP:  - Schedule follow-up visit for 2 weeks, with repeat X-ray of left ring finger.  - 2 weeks of Bactrim DS BID         Should the patient's symptoms worsen, persist, or fail to improve they should return for reevaluation and I would be happy to see them back anytime.        Darrius Swain M.D.    Please be aware that this note has been  generated with the assistance of [employer] voice-to-text.  Please excuse any spelling or grammatical errors.    Thank you for choosing Dr. Darrius Swain for your orthopedic hand and upper extremity care. It is our goal to provide you with exceptional care that will help keep you healthy, active, and get you back in the game.     If you felt that you received exemplary care today, please consider leaving feedback for Dr. Swain on [employer] at [email or web address].    Please do not hesitate to reach out to us via email, phone, or MyChart with any questions, concerns, or feedback.

## 2024-11-22 ENCOUNTER — ATHLETIC TRAINING SESSION (OUTPATIENT)
Dept: SPORTS MEDICINE | Facility: CLINIC | Age: 19
End: 2024-11-22
Payer: COMMERCIAL

## 2024-11-22 DIAGNOSIS — M79.674 PAIN OF RIGHT GREAT TOE: Primary | ICD-10-CM

## 2024-11-22 NOTE — PROGRESS NOTES
Reason for Encounter Follow-Up    Subjective:       Chief Complaint: Donald Grewal is a 19 y.o. male student at [employer]) who had concerns including Pain of the Right Foot.    Donald started complaining of R big toe pain after running at workouts on Tuesday. He has mentioned toe pain in the past, typically provoked by jumping or running. All symptoms occur in the toe off positions. He was a little TTP over his 1st metatarsal but all fracture test were negative.     Handedness: right-handed  Sport played: baseball      Level: college      Position:pitcher      Pain        ROS              Objective:       General: Donald is well-developed, well-nourished, appears stated age, in no acute distress, alert and oriented to time, place and person.     AT Session          Assessment:     Status: L - Limited (for other injury)    Date Seen:  11/17/2024-11/23/2024    Date of Injury:  11/19/2024    Date Out:  NA    Date Cleared:  NA        Treatment/Rehab/Maintenance:   Donald completed therapeutic exercises to develop strength:    Date: 11/20/2024    Exercises  Sets x Reps  Weight   Big toe raises  2 x 10     Small toe raises  2 x 10     [place] waves  2 x 10     Short arches  2 x 10                                                  Plan:       1. Come in to work on foot strength a couple times a week.   2. Physician Referral: no  3. ED Referral:no  4. Parent/Guardian Notified: No  5. All questions were answered, ath. will contact me for questions or concerns in  the interim.  6.         Eligible to use School Insurance: Yes

## 2024-12-03 ENCOUNTER — HOSPITAL ENCOUNTER (OUTPATIENT)
Dept: RADIOLOGY | Facility: HOSPITAL | Age: 19
Discharge: HOME | End: 2024-12-03
Attending: ORTHOPAEDIC SURGERY
Payer: COMMERCIAL

## 2024-12-03 ENCOUNTER — OFFICE VISIT (OUTPATIENT)
Dept: ORTHOPEDICS | Facility: CLINIC | Age: 19
End: 2024-12-03
Payer: COMMERCIAL

## 2024-12-03 DIAGNOSIS — M79.645 FINGER PAIN, LEFT: Primary | ICD-10-CM

## 2024-12-03 DIAGNOSIS — M79.645 FINGER PAIN, LEFT: ICD-10-CM

## 2024-12-03 DIAGNOSIS — S62.635A CLOSED FRACTURE OF TUFT OF DISTAL PHALANX OF LEFT RING FINGER: ICD-10-CM

## 2024-12-03 PROCEDURE — 73140 X-RAY EXAM OF FINGER(S): CPT | Mod: 26,LT,, | Performed by: RADIOLOGY

## 2024-12-03 PROCEDURE — 99214 OFFICE O/P EST MOD 30 MIN: CPT | Mod: S$GLB,,, | Performed by: ORTHOPAEDIC SURGERY

## 2024-12-03 PROCEDURE — 73140 X-RAY EXAM OF FINGER(S): CPT | Mod: TC,LT

## 2024-12-03 PROCEDURE — 1159F MED LIST DOCD IN RCRD: CPT | Mod: CPTII,S$GLB,, | Performed by: ORTHOPAEDIC SURGERY

## 2024-12-03 PROCEDURE — 99999 PR PBB SHADOW E&M-EST. PATIENT-LVL III: CPT | Mod: PBBFAC,,, | Performed by: ORTHOPAEDIC SURGERY

## 2024-12-03 NOTE — PROGRESS NOTES
[facility]  History & Physical  Orthopedics    SUBJECTIVE:      History of Present Illness      CHIEF COMPLAINT:  - Donald who is right-handed presents today for follow-up evaluation of a left ring finger injury sustained while weightlifting on 11/11/24.     HPI:  Donald presents with an injury to his left ring finger, which occurred on Monday when he dropped a weightlifting bar on his finger, resulting in a smashed and split finger. He immediately went to the ER at the main campus, where the wound was sutured and the entire fingernail removed. Donald reports that the injury caused significant damage, and there is mention of a small fracture in the bone. He has been prescribed several medications, including cephalexin (4 times daily), naproxen (twice daily), Bactrim (twice daily), and oxycodone as needed. Donald's , Rogelio, has been cleaning the wound and changing the dressing. The dressing tends to get wet and sweaty during workouts and showers, despite attempts to keep it dry. Donald worked out on Tuesday, focusing only on his right side. 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    Interval history 11/19/2024:  Donald presents today for follow-up of left ring finger chris fracture with laceration and nail bed removal in the ED. Patient is doing well today. He has stopped weight lifting, as instructed, and has been taking care to keep his finger and dressing dry. Patient reports pain improved. Denies paresthesias, numbness, or weakness. He reports some stiffness in his finger, which has improved. \    Interval history 12/3/2024:   Donald presents today for re-evaluation.  He is doing well today.  Denies pain or paresthesia.  He states he is keeping his left finger injury site clean and dry, changing the band aid throughout the day. Patient completed 2 weeks of Bactrim DS BID.      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  Review of patient's allergies indicates:   Allergen Reactions    Latex, natural rubber Hives     Social History     Social History Narrative    11th grader    Lives with parents    2 siblings     Plays football and baseball      No family history on file.      Current Outpatient Medications:     budesonide/formoterol fumarate (SYMBICORT INHL), Inhale into the lungs., Disp: , Rfl:     celecoxib (CELEBREX) 200 MG capsule, Take 1 capsule (200 mg total) by mouth 2 (two) times daily., Disp: 30 capsule, Rfl: 2    ibuprofen (ADVIL,MOTRIN) 100 mg/5 mL suspension, Take by mouth every 6 (six) hours as needed for Temperature greater than., Disp: , Rfl:     montelukast sodium (SINGULAIR ORAL), Take by mouth. (Patient not taking: Reported on 11/11/2024), Disp: , Rfl:     sulfamethoxazole-trimethoprim 800-160mg (BACTRIM DS) 800-160 mg Tab, Take 1 tablet by mouth 2 (two) times daily. for 14 days, Disp: 28 tablet, Rfl: 0    OBJECTIVE:      Vital Signs (Most Recent):  There were no vitals filed for this visit.    There is no height or weight on file to calculate BMI.    Physical Exam              Left Hand/Wrist Examination:    Observation/Inspection:  Swelling   mild    Deformity  none  Discoloration   Ecchymosis left ring fingertip     Scars   none    Atrophy  None   Left ring finger soft tissue injury status post reapproximation in the ED with chromic      Neurovascular Exam:  Digits WWP, brisk CR < 3s throughout  NVI  motor/LTS to M/R/U nerves, radial pulse 2+  ROM hand full, painless except the left ring finger which is painful with range motion although FDP is intact by exam    ROM wrist full, painless    ROM elbow full, painless    Abdomen not guarded  Respirations nonlabored  Perfusion intact    Diagnostic Results:     Imaging - I independently viewed the patient's imaging as well as the radiology report.  Xrays of the patient's  left ring finger left ring finger chris fracture.    EMG -  none    ASSESSMENT/PLAN:      19 y.o. yo male with  left ring finger tuft fracture status post repair in the ED  Plan: The patient and I had a thorough discussion today.  We discussed the working diagnosis as well as several other potential alternative diagnoses.  Treatment options were discussed, both conservative and surgical.  Conservative treatment options would include things such as activity modifications, workplace modifications, a period of rest, oral vs topical OTC and prescription anti-inflammatory medications, occupational therapy, splinting/bracing, immobilization, corticosteroid injections, and others.  Surgical options were discussed as well.     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 Keep dry and clean band-aid on finger. Ok to lightly scrub finger to encourage dissolvable sutures to start dissolving off.     RETURN TO ACTIVITY:  - Ok to resume activities, including baseball, as tolerated.  Recommend washing L RF with soap and water and replacing band-aid if gets sweaty.     PROCEDURES:  - Removed band aid from patient's injured left ring finger. Examine patient's finger, including checking tendon function. Apply new band aid to the injured finger. Gentle range of motion is as tolerated at this time.  No splint.    FOLLOW UP:  - Schedule follow-up visit for  4 weeks.        Should the patient's symptoms worsen, persist, or fail to improve they should return for reevaluation and I would be happy to see them back anytime.        Darrius Swain M.D.    Please be aware that this note has been generated with the assistance of [employer] voice-to-text.  Please excuse any spelling or grammatical errors.    Thank you for choosing Dr. Darrius Swain for your orthopedic hand and upper extremity care. It is our goal to provide you with exceptional care that will help keep you healthy, active, and get you back in the game.     If you felt that you received exemplary care today, please consider leaving feedback for Dr. Swain on [employer] at [email or web address].    Please do not hesitate to reach out to us via email, phone, or MyChart with any questions, concerns, or feedback.

## 2024-12-04 ENCOUNTER — ATHLETIC TRAINING SESSION (OUTPATIENT)
Dept: SPORTS MEDICINE | Facility: CLINIC | Age: 19
End: 2024-12-04
Payer: COMMERCIAL

## 2024-12-04 DIAGNOSIS — M79.674 PAIN OF RIGHT GREAT TOE: Primary | ICD-10-CM

## 2024-12-04 NOTE — PROGRESS NOTES
Reason for Encounter Follow-Up    Subjective:       Chief Complaint: Donald Grewal is a 19 y.o. male student at [employer]) who had concerns including Pain of the Right Foot.    Donald started complaining of R big toe pain after running at workouts on Tuesday. He has mentioned toe pain in the past, typically provoked by jumping or running. All symptoms occur in the toe off positions. He was a little TTP over his 1st metatarsal but all fracture test were negative.     Handedness: right-handed  Sport played: baseball      Level: college      Position:pitcher      Pain        ROS              Objective:       General: Donald is well-developed, well-nourished, appears stated age, in no acute distress, alert and oriented to time, place and person.     AT Session          Assessment:     Status: L - Limited (for other injury)    Date Seen:  11/24/2024-11/30/2024    Date of Injury:  11/19/2024    Date Out:  NA    Date Cleared:  NA        Treatment/Rehab/Maintenance:   Donald completed therapeutic exercises to develop strength:    Date: 11/25/2024    Exercises  Sets x Reps  Weight   Big toe raises  2 x 10     Small toe raises  2 x 10     [place] waves  2 x 10     Short arches  2 x 10     Toe extension calf raises  3 x 10     Toe stretch on wall 3 x 30s                                        Plan:       1. Come in to work on foot strength a couple times a week.   2. Physician Referral: no  3. ED Referral:no  4. Parent/Guardian Notified: No  5. All questions were answered, ath. will contact me for questions or concerns in  the interim.  6.         Eligible to use School Insurance: Yes

## 2025-01-02 ENCOUNTER — OFFICE VISIT (OUTPATIENT)
Dept: ORTHOPEDICS | Facility: CLINIC | Age: 20
End: 2025-01-02
Payer: COMMERCIAL

## 2025-01-02 ENCOUNTER — HOSPITAL ENCOUNTER (OUTPATIENT)
Dept: RADIOLOGY | Facility: HOSPITAL | Age: 20
Discharge: HOME | End: 2025-01-02
Attending: ORTHOPAEDIC SURGERY
Payer: COMMERCIAL

## 2025-01-02 VITALS — BODY MASS INDEX: 28.46 KG/M2 | HEIGHT: 72 IN | WEIGHT: 210.13 LBS

## 2025-01-02 DIAGNOSIS — M79.645 FINGER PAIN, LEFT: Primary | ICD-10-CM

## 2025-01-02 DIAGNOSIS — M79.645 FINGER PAIN, LEFT: ICD-10-CM

## 2025-01-02 DIAGNOSIS — S62.635A CLOSED FRACTURE OF TUFT OF DISTAL PHALANX OF LEFT RING FINGER: Primary | ICD-10-CM

## 2025-01-02 PROCEDURE — 99213 OFFICE O/P EST LOW 20 MIN: CPT | Mod: S$GLB,,, | Performed by: ORTHOPAEDIC SURGERY

## 2025-01-02 PROCEDURE — 99999 PR PBB SHADOW E&M-EST. PATIENT-LVL III: CPT | Mod: PBBFAC,,, | Performed by: ORTHOPAEDIC SURGERY

## 2025-01-02 PROCEDURE — 73140 X-RAY EXAM OF FINGER(S): CPT | Mod: TC,LT

## 2025-01-02 PROCEDURE — 3008F BODY MASS INDEX DOCD: CPT | Mod: CPTII,S$GLB,, | Performed by: ORTHOPAEDIC SURGERY

## 2025-01-02 PROCEDURE — 73140 X-RAY EXAM OF FINGER(S): CPT | Mod: 26,LT,, | Performed by: INTERNAL MEDICINE

## 2025-01-02 PROCEDURE — 1159F MED LIST DOCD IN RCRD: CPT | Mod: CPTII,S$GLB,, | Performed by: ORTHOPAEDIC SURGERY

## 2025-01-02 NOTE — PROGRESS NOTES
[facility]  History & Physical  Orthopedics    SUBJECTIVE:      History of Present Illness      CHIEF COMPLAINT:  - Donald who is right-handed presents today for follow-up evaluation of a left ring finger injury sustained while weightlifting on 11/11/24.     HPI:  Donald presents with an injury to his left ring finger, which occurred on Monday when he dropped a weightlifting bar on his finger, resulting in a smashed and split finger. He immediately went to the ER at the main campus, where the wound was sutured and the entire fingernail removed. Donald reports that the injury caused significant damage, and there is mention of a small fracture in the bone. He has been prescribed several medications, including cephalexin (4 times daily), naproxen (twice daily), Bactrim (twice daily), and oxycodone as needed. Donald's , Rogelio, has been cleaning the wound and changing the dressing. The dressing tends to get wet and sweaty during workouts and showers, despite attempts to keep it dry. Donald worked out on Tuesday, focusing only on his right side. 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    Interval history 11/19/2024:  Donald presents today for follow-up of left ring finger chris fracture with laceration and nail bed removal in the ED. Patient is doing well today. He has stopped weight lifting, as instructed, and has been taking care to keep his finger and dressing dry. Patient reports pain improved. Denies paresthesias, numbness, or weakness. He reports some stiffness in his finger, which has improved. \    Interval history 12/3/2024:   Donald presents today for re-evaluation.  He is doing well today.  Denies pain or paresthesia.  He states he is keeping his left finger injury site clean and dry, changing the band aid throughout the day. Patient completed 2 weeks of Bactrim DS BID.       Interval history January 2, 2025:  The patient returns today for re-evaluation.  He reports that he is essentially back to full activities with his left ring finger.  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  He reports his wound is healing well and a new nail is growing in.  He returns for re-evaluation with no other complaints.     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  Review of patient's allergies indicates:   Allergen Reactions    Latex, natural rubber Hives     Social History     Social History Narrative    11th grader    Lives with parents    2 siblings     Plays football and baseball      No family history on file.      Current Outpatient Medications:     budesonide/formoterol fumarate (SYMBICORT INHL), Inhale into the lungs., Disp: , Rfl:     celecoxib (CELEBREX) 200 MG capsule, Take 1 capsule (200 mg total) by mouth 2 (two) times daily., Disp: 30 capsule, Rfl: 2    ibuprofen (ADVIL,MOTRIN) 100 mg/5 mL suspension, Take by mouth every 6 (six) hours as needed for Temperature greater than., Disp: , Rfl:     montelukast sodium (SINGULAIR ORAL), Take by mouth. (Patient not taking: Reported on 11/11/2024), Disp: , Rfl:     OBJECTIVE:      Vital Signs (Most Recent):  Vitals:    01/02/25 1106   Weight: 95.3 kg (210 lb 1.6 oz)   Height: 6' (1.829 m)       Body mass index is 28.49 kg/m².    Physical Exam              Left Hand/Wrist Examination:    Observation/Inspection:  Swelling    none    Deformity  none  Discoloration    none    Scars     Left ring finger, well-healed    Atrophy  None       Neurovascular Exam:  Digits WWP, brisk CR < 3s throughout  NVI motor/LTS to M/R/U nerves, radial pulse 2+  ROM hand full, painless  today and the patient can make a full composite fist without difficulty    ROM wrist full, painless    ROM elbow full, painless    Abdomen not guarded  Respirations nonlabored  Perfusion intact    Diagnostic Results:     Imaging - I independently viewed the patient's imaging as well as the radiology report.  Xrays of the patient's  left ring finger left ring finger chris fracture, no complications.    EMG -  none    ASSESSMENT/PLAN:      19 y.o. yo male with  left ring finger tuft fracture status post repair in the ED  Plan: The patient and I had a thorough discussion today.  We discussed the working diagnosis as well as several other potential alternative diagnoses.  Treatment options were discussed, both conservative and surgical.  Conservative treatment options would include things such as activity modifications, workplace modifications, a period of rest, oral vs topical OTC and prescription anti-inflammatory medications, occupational therapy, splinting/bracing, immobilization, corticosteroid injections, and others.  Surgical options were discussed as well.     Assessment & Plan      At this time, Donald is doing great.  He may continue unrestricted activities and return to sports without restrictions.  Follow up with me on an as needed/if needed basis.        Should the patient's symptoms worsen, persist, or fail to improve they should return for reevaluation and I would be happy to see them back anytime.        Darrius Swain M.D.    Please be aware that this note has been generated with the assistance of [employer] voice-to-text.  Please excuse any spelling or grammatical errors.    Thank you for choosing Dr. Darrius Swain for your orthopedic hand and upper extremity care. It is our goal to provide you with  exceptional care that will help keep you healthy, active, and get you back in the game.     If you felt that you received exemplary care today, please consider leaving feedback for Dr. Swain on [employer] at [email or web address].    Please do not hesitate to reach out to us via email, phone, or MyChart with any questions, concerns, or feedback.

## 2025-01-17 ENCOUNTER — DOCUMENTATION ONLY (OUTPATIENT)
Dept: ORTHOPEDICS | Facility: CLINIC | Age: 20
End: 2025-01-17
Payer: COMMERCIAL

## 2025-01-17 ENCOUNTER — PATIENT MESSAGE (OUTPATIENT)
Dept: ORTHOPEDICS | Facility: CLINIC | Age: 20
End: 2025-01-17
Payer: COMMERCIAL

## 2025-01-17 NOTE — PROGRESS NOTES
Good morning,     The Physical/Occupational Therapy department received your order to get the attached patient scheduled for an evaluation. We have reached out to the patient to schedule their evaluation; however, we have been unsuccessful in reaching them.      We wanted you to be aware that your patient has not started therapy. If you speak with them again about starting therapy please have them reach out to us at [phone] to get scheduled.

## 2025-02-11 ENCOUNTER — ATHLETIC TRAINING SESSION (OUTPATIENT)
Dept: SPORTS MEDICINE | Facility: CLINIC | Age: 20
End: 2025-02-11
Payer: COMMERCIAL

## 2025-02-11 DIAGNOSIS — Z00.00 HEALTH CARE MAINTENANCE: Primary | ICD-10-CM

## 2025-02-11 NOTE — PROGRESS NOTES
Reason for Encounter N/A    Subjective:       Chief Complaint: Donald Grewal is a 19 y.o. male student at [employer]) who had concerns including Health Maintenance of the Right Upper Arm.    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 Donald has a hx of a lumbar disk herniation, so we are adding in hip mobility and back exercises.     Handedness: right-handed  Sport played: baseball      Level: college      Position:pitcher          ROS              Objective:       General: Donald is well-developed, well-nourished, appears stated age, in no acute distress, alert and oriented to time, place and person.     AT Session          Assessment:     Status: F - Full Participation    Date Seen:  01/26/2025-02/01/2025    Date of Injury:  NA    Date Out:  NA    Date Cleared:  NA        Treatment/Rehab/Maintenance:   Donald completed therapeutic exercises to develop strength and ROM:    Date: 02/01/2025    Exercises  Sets x Reps  Weight   Shoulder CARs 3 x 10     Tea cups  3 x 10  2 lbs    Pass throughs  3 x 10     90/90 w reach  2 x 10     KB groin rocks  2 x 10  10 lbs    Lunge with half splits  2 x 10     Band stretch hip circles  2 x 10  Blue theraband                          Post Throw  Date: 01/30/2025    Shoulder CARs x 10  Half kneeling T spine opener x 10 each side   Half Kneeling T spine CARs x 10 each side   Cat cows x 10   Tea cups x 10   Diagonal pull a parts x 10   90/90 plyo ball catches x 10   Forward arm ball catches x 10   Lateral arm ball catches x 10      Pre-Throw  Date: 01/30/2025    I, Y, T, W with J bands 1 x 5   Throwing motion with J bands 1 x 55   Reverse throws 1 x 5   Pivot pick off holds 1 x 5   Roll in holds 1 x 5   Walking wind up 1 x 5       Donald completed therapeutic exercises to develop strength and ROM:    Date: 01/30/2025    Exercises  Sets x Reps  Weight   Shoulder CARs 3 x 10     Tea cups  3 x 10  2 lbs    Pass  throughs  3 x 10     Bird dog crunch  3 x 10  3 lbs    Yoga push up to table top  3 x 10     Single leg bridge with shoulder flexion and extension  3 x 10  3 lbs    Ball pass around leg  3 x 10  3.3 lbs    Lunge to RDL w row  3 x 10  Blue theraband    90/90 w reach  2 x 10     KB groin rocks  2 x 10  20 lbs    Lunge and half splits  2 x 10     Band stretch hip circles  2 x 10         Post Throw  Date: 01/26/2025    Shoulder CARs x 10  Half kneeling T spine opener x 10 each side   Half Kneeling T spine CARs x 10 each side   Cat cows x 10   Tea cups x 10   Diagonal pull a parts x 10   90/90 plyo ball catches x 10   Forward arm ball catches x 10   Lateral arm ball catches x 10      Pre-Throw  Date: 01/26/2025    I, Y, T, W with J bands 1 x 5   Throwing motion with J bands 1 x 55   Reverse throws 1 x 5   Pivot pick off holds 1 x 5   Roll in holds 1 x 5   Walking wind up 1 x 5       Plan:       1. Come in daily for arm care. Do pre and post throw on pitching days.   2. Physician Referral: no  3. ED Referral:no  4. Parent/Guardian Notified: No  5. All questions were answered, ath. will contact me for questions or concerns in  the interim.  6.         Eligible to use School Insurance: Yes

## 2025-02-11 NOTE — PROGRESS NOTES
Reason for Encounter N/A    Subjective:       Chief Complaint: Donald Grewal is a 19 y.o. male student at [employer]) who had concerns including Health Maintenance of the Right Upper Arm.    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 Donald has a hx of a lumbar disk herniation, so we are adding in hip mobility and back exercises.     Handedness: right-handed  Sport played: baseball      Level: college      Position:pitcher          ROS              Objective:       General: Donald is well-developed, well-nourished, appears stated age, in no acute distress, alert and oriented to time, place and person.     AT Session          Assessment:     Status: F - Full Participation    Date Seen:  02/02/2025-02/08/2025    Date of Injury:  NA    Date Out:  NA    Date Cleared:  NA        Treatment/Rehab/Maintenance:   Donald completed therapeutic exercises to develop strength and ROM:    Date: 02/04/2025    Exercises  Sets x Reps  Weight   Shoulder CARs 3 x 10     Tea cups  3 x 10  2 lbs    Pass throughs  3 x 10     Bird dog crunch  3 x 10  3 lbs    Yoga push up to table top  3 x 10          Ball pass around leg  3 x 10  3.3 lbs    Lunge to RDL w row  3 x 10  Blue theraband                            Post Throw  Date: 02/02/2025    Shoulder CARs x 10  Half kneeling T spine opener x 10 each side   Half Kneeling T spine CARs x 10 each side   Cat cows x 10   Tea cups x 10   Diagonal pull a parts x 10   90/90 plyo ball catches x 10   Forward arm ball catches x 10   Lateral arm ball catches x 10      Pre-Throw  Date: 02/02/2025    I, Y, T, W with J bands 1 x 5   Throwing motion with J bands 1 x 55   Reverse throws 1 x 5   Pivot pick off holds 1 x 5   Roll in holds 1 x 5   Walking wind up 1 x 5       Plan:       1. Come in daily for arm care. Do pre and post throw on pitching days.   2. Physician Referral: no  3. ED Referral:no  4. Parent/Guardian Notified:  No  5. All questions were answered, ath. will contact me for questions or concerns in  the interim.  6.         Eligible to use School Insurance: Yes

## 2025-03-05 ENCOUNTER — ATHLETIC TRAINING SESSION (OUTPATIENT)
Dept: SPORTS MEDICINE | Facility: CLINIC | Age: 20
End: 2025-03-05
Payer: COMMERCIAL

## 2025-03-05 DIAGNOSIS — Z00.00 HEALTH CARE MAINTENANCE: Primary | ICD-10-CM

## 2025-03-05 NOTE — PROGRESS NOTES
Reason for Encounter N/A    Subjective:       Chief Complaint: Donald Grewal is a 19 y.o. male student at [employer]) who had concerns including Health Maintenance of the Right Upper Arm.    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 Donald has a hx of a lumbar disk herniation, so we are adding in hip mobility and back exercises.     Handedness: right-handed  Sport played: baseball      Level: college      Position:pitcher          ROS              Objective:       General: Donald is well-developed, well-nourished, appears stated age, in no acute distress, alert and oriented to time, place and person.     AT Session          Assessment:     Status: F - Full Participation    Date Seen:  03/02/2025-03/08/2025    Date of Injury:  NA    Date Out:  NA    Date Cleared:  NA        Treatment/Rehab/Maintenance:   Donald completed therapeutic exercises to develop strength and ROM:    Date: 03/05/2025    Exercises  Sets x Reps  Weight   Shoulder CARs 3 x 10     Tea cups  3 x 10  2 lbs    Pass throughs  3 x 10     Bird dog crunch  3 x 10  3 lbs    Yoga push up to table top  3 x 10     Ball pass around leg  3 x 10  3.3 lbs    Lunge to RDL w row  3 x 10  Blue theraband    90/90 w/ reach  2 x 10     KB groin rocks  2 x 10  20 lbs    Lunge to half splits  2 x 10     Band straight leg hip circles  2 x 10  Blue theraband          Donald completed therapeutic exercises to develop strength and ROM:    Date: 03/05/2025    Exercises  Sets x Reps  Weight   Shoulder CARs 3 x 10     Tea cups  3 x 10  2 lbs    Pass throughs  3 x 10     Bird dog crunch  3 x 10  3 lbs    Yoga push up to table top  3 x 10     Ball pass around leg  3 x 10  3.3 lbs    Lunge to RDL w row  3 x 10  Blue theraband                              Pre-Throw  Date: 03/03/2025    I, Y, T, W with J bands 1 x 5   Throwing motion with J bands 1 x 55   Reverse throws 1 x 5   Pivot pick off holds 1 x 5    Roll in holds 1 x 5   Walking wind up 1 x 5       Plan:       1. Come in daily for arm care. Do pre and post throw on pitching days.   2. Physician Referral: no  3. ED Referral:no  4. Parent/Guardian Notified: No  5. All questions were answered, ath. will contact me for questions or concerns in  the interim.  6.         Eligible to use School Insurance: Yes

## 2025-03-05 NOTE — PROGRESS NOTES
Reason for Encounter N/A    Subjective:       Chief Complaint: Donald Grewal is a 19 y.o. male student at [employer]) who had concerns including Health Maintenance of the Right Upper Arm.    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 Donald has a hx of a lumbar disk herniation, so we are adding in hip mobility and back exercises.     Handedness: right-handed  Sport played: baseball      Level: college      Position:pitcher          ROS              Objective:       General: Donald is well-developed, well-nourished, appears stated age, in no acute distress, alert and oriented to time, place and person.     AT Session          Assessment:     Status: F - Full Participation    Date Seen:  02/16/2025-02/22/2025    Date of Injury:  NA    Date Out:  NA    Date Cleared:  NA        Treatment/Rehab/Maintenance:   Pre-Throw  Date: 02/18/2025    I, Y, T, W with J bands 1 x 5   Throwing motion with J bands 1 x 55   Reverse throws 1 x 5   Pivot pick off holds 1 x 5   Roll in holds 1 x 5   Walking wind up 1 x 5       Plan:       1. Come in daily for arm care. Do pre and post throw on pitching days.   2. Physician Referral: no  3. ED Referral:no  4. Parent/Guardian Notified: No  5. All questions were answered, ath. will contact me for questions or concerns in  the interim.  6.         Eligible to use School Insurance: Yes

## 2025-03-05 NOTE — PROGRESS NOTES
Reason for Encounter N/A    Subjective:       Chief Complaint: Donald Grewal is a 19 y.o. male student at [employer]) who had concerns including Health Maintenance of the Right Upper Arm.    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 Donald has a hx of a lumbar disk herniation, so we are adding in hip mobility and back exercises.     Handedness: right-handed  Sport played: baseball      Level: college      Position:pitcher          ROS              Objective:       General: Donald is well-developed, well-nourished, appears stated age, in no acute distress, alert and oriented to time, place and person.     AT Session          Assessment:     Status: F - Full Participation    Date Seen:  02/09/2025-02/15/2025    Date of Injury:  NA    Date Out:  NA    Date Cleared:  NA        Treatment/Rehab/Maintenance:   Post Throw  Date: 02/15/2025    Shoulder CARs x 10  Half kneeling T spine opener x 10 each side   Half Kneeling T spine CARs x 10 each side   Cat cows x 10   Tea cups x 10   Diagonal pull a parts x 10   90/90 plyo ball catches x 10   Forward arm ball catches x 10   Lateral arm ball catches x 10      Pre-Throw  Date: 02/15/2025    I, Y, T, W with J bands 1 x 5   Throwing motion with J bands 1 x 55   Reverse throws 1 x 5   Pivot pick off holds 1 x 5   Roll in holds 1 x 5   Walking wind up 1 x 5       Plan:       1. Come in daily for arm care. Do pre and post throw on pitching days.   2. Physician Referral: no  3. ED Referral:no  4. Parent/Guardian Notified: No  5. All questions were answered, ath. will contact me for questions or concerns in  the interim.  6.         Eligible to use School Insurance: Yes

## 2025-03-26 ENCOUNTER — ATHLETIC TRAINING SESSION (OUTPATIENT)
Dept: SPORTS MEDICINE | Facility: CLINIC | Age: 20
End: 2025-03-26
Payer: COMMERCIAL

## 2025-03-26 DIAGNOSIS — Z00.00 HEALTH CARE MAINTENANCE: Primary | ICD-10-CM

## 2025-03-26 NOTE — PROGRESS NOTES
Reason for Encounter N/A    Subjective:       Chief Complaint: Donald Grewal is a 19 y.o. male student at [employer]) who had concerns including Health Maintenance of the Right Upper Arm.    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 Donald has a hx of a lumbar disk herniation, so we are adding in hip mobility and back exercises.     Handedness: right-handed  Sport played: baseball      Level: college      Position:pitcher          ROS              Objective:       General: Donald is well-developed, well-nourished, appears stated age, in no acute distress, alert and oriented to time, place and person.     AT Session          Assessment:     Status: F - Full Participation    Date Seen:  03/23/2025-03/29/2025    Date of Injury:  NA    Date Out:  NA    Date Cleared:  NA        Treatment/Rehab/Maintenance:   Donald completed therapeutic exercises to develop strength and ROM:    Date: 03/25/2025    Exercises  Sets x Reps  Weight   Shoulder CARs 3 x 10     Tea cups  3 x 10  2 lbs    Pass throughs  3 x 10     Yoga push up to table top  3 x 10                                           Plan:       1. Come in daily for arm care. Do pre and post throw on pitching days.   2. Physician Referral: no  3. ED Referral:no  4. Parent/Guardian Notified: No  5. All questions were answered, ath. will contact me for questions or concerns in  the interim.  6.         Eligible to use School Insurance: Yes

## 2025-03-26 NOTE — PROGRESS NOTES
Reason for Encounter N/A    Subjective:       Chief Complaint: Donald Grewal is a 19 y.o. male student at [employer]) who had concerns including Health Maintenance of the Right Upper Arm.    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 Donald has a hx of a lumbar disk herniation, so we are adding in hip mobility and back exercises.     Handedness: right-handed  Sport played: baseball      Level: college      Position:pitcher          ROS              Objective:       General: Donald is well-developed, well-nourished, appears stated age, in no acute distress, alert and oriented to time, place and person.     AT Session          Assessment:     Status: F - Full Participation    Date Seen:  03/09/2025-03/15/2025    Date of Injury:  NA    Date Out:  NA    Date Cleared:  NA        Treatment/Rehab/Maintenance:           Pre-Throw  Date: 03/14/2025    I, Y, T, W with J bands 1 x 5   Throwing motion with J bands 1 x 55   Reverse throws 1 x 5   Pivot pick off holds 1 x 5   Roll in holds 1 x 5   Walking wind up 1 x 5       Plan:       1. Come in daily for arm care. Do pre and post throw on pitching days.   2. Physician Referral: no  3. ED Referral:no  4. Parent/Guardian Notified: No  5. All questions were answered, ath. will contact me for questions or concerns in  the interim.  6.         Eligible to use School Insurance: Yes

## 2025-03-26 NOTE — PROGRESS NOTES
Reason for Encounter N/A    Subjective:       Chief Complaint: Donald Grewal is a 19 y.o. male student at [employer]) who had concerns including Health Maintenance of the Right Upper Arm.    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 Donald has a hx of a lumbar disk herniation, so we are adding in hip mobility and back exercises.     Handedness: right-handed  Sport played: baseball      Level: college      Position:pitcher          ROS              Objective:       General: Donald is well-developed, well-nourished, appears stated age, in no acute distress, alert and oriented to time, place and person.     AT Session          Assessment:     Status: F - Full Participation    Date Seen:  03/16/2025-03/22/2025    Date of Injury:  NA    Date Out:  NA    Date Cleared:  NA        Treatment/Rehab/Maintenance:   Donald completed therapeutic exercises to develop strength and ROM:    Date: 03/18/2025    Exercises  Sets x Reps  Weight   Shoulder CARs 3 x 10     Tea cups  3 x 10  2 lbs    Pass throughs  3 x 10     Yoga push up to table top  3 x 10                                           Plan:       1. Come in daily for arm care. Do pre and post throw on pitching days.   2. Physician Referral: no  3. ED Referral:no  4. Parent/Guardian Notified: No  5. All questions were answered, ath. will contact me for questions or concerns in  the interim.  6.         Eligible to use School Insurance: Yes

## 2025-04-07 ENCOUNTER — ATHLETIC TRAINING SESSION (OUTPATIENT)
Dept: SPORTS MEDICINE | Facility: CLINIC | Age: 20
End: 2025-04-07
Payer: COMMERCIAL

## 2025-04-07 DIAGNOSIS — Z00.00 HEALTH CARE MAINTENANCE: Primary | ICD-10-CM

## 2025-04-07 NOTE — PROGRESS NOTES
Reason for Encounter N/A    Subjective:       Chief Complaint: Donald Grewal is a 19 y.o. male student at [employer]) who had concerns including Health Maintenance of the Lower Back.    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 Donald has a hx of a lumbar disk herniation, so we are adding in hip mobility and back exercises.     Handedness: right-handed  Sport played: baseball      Level: college      Position:pitcher          ROS              Objective:       General: Donald is well-developed, well-nourished, appears stated age, in no acute distress, alert and oriented to time, place and person.     AT Session          Assessment:     Status: F - Full Participation    Date Seen:  03/30/2025-04/05/2025    Date of Injury:  NA    Date Out:  NA    Date Cleared:  NA        Treatment/Rehab/Maintenance:   Donald completed therapeutic exercises to develop strength and ROM:    Date: 04/03/2025    Exercises  Sets x Reps  Weight   Cat cows  2 x 10     Figure 4 w/ hamstring floss 2 x 10     Kneeling hip CARs  2 x 10     Standing marches with OH hold 3 x 10  Green theraband and 10 lbs    6 in holds with snow angles  3 x 10  2 lbs    Scissor kicks with toe touch  3 x 10  2 lbs    Pistol squats  2 x 10                          Post Throw  Date: 04/03/2025    Shoulder CARs x 10  Half kneeling T spine opener x 10 each side   Half Kneeling T spine CARs x 10 each side   Cat cows x 10   Tea cups x 10   Diagonal pull a parts x 10   90/90 plyo ball catches x 10   Forward arm ball catches x 10   Lateral arm ball catches x 10      Pre-Throw  Date: 04/03/2025    I, Y, T, W with J bands 1 x 5   Throwing motion with J bands 1 x 55   Reverse throws 1 x 5   Pivot pick off holds 1 x 5   Roll in holds 1 x 5   Walking wind up 1 x 5         Donald completed therapeutic exercises to develop strength and ROM:    Date: 04/03/2025    Exercises  Sets x Reps  Weight   Cat cows   2 x 10     Figure 4 w/ hamstring floss 2 x 10     Kneeling hip CARs  2 x 10     Standing marches with OH hold 3 x 10  Green theraband and 10 lbs                                          Plan:       1. Come in daily for arm care. Do pre and post throw on pitching days.   2. Physician Referral: no  3. ED Referral:no  4. Parent/Guardian Notified: No  5. All questions were answered, ath. will contact me for questions or concerns in  the interim.  6.         Eligible to use School Insurance: Yes

## 2025-04-22 ENCOUNTER — ATHLETIC TRAINING SESSION (OUTPATIENT)
Dept: SPORTS MEDICINE | Facility: CLINIC | Age: 20
End: 2025-04-22
Payer: COMMERCIAL

## 2025-04-22 DIAGNOSIS — Z00.00 HEALTH CARE MAINTENANCE: Primary | ICD-10-CM

## 2025-04-22 NOTE — PROGRESS NOTES
Reason for Encounter N/A    Subjective:       Chief Complaint: Donald Grewal is a 19 y.o. male student at [employer]) who had concerns including Health Maintenance of the Lower Back.    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 Donald has a hx of a lumbar disk herniation, so we are adding in hip mobility and back exercises.     Handedness: right-handed  Sport played: baseball      Level: college      Position:pitcher          ROS              Objective:       General: Donald is well-developed, well-nourished, appears stated age, in no acute distress, alert and oriented to time, place and person.     AT Session          Assessment:     Status: F - Full Participation    Date Seen:  04/06/2025-04/12/2025    Date of Injury:  NA    Date Out:  NA    Date Cleared:  NA        Treatment/Rehab/Maintenance:   Donald completed therapeutic exercises to develop strength and ROM:    Date: 04/11/2025    Exercises  Sets x Reps  Weight   Cat cows  2 x 10     Figure 4 w/ hamstring floss 2 x 10     Kneeling hip CARs  2 x 10     Standing marches with OH hold 3 x 10  Green theraband and 10 lbs    6 in holds with snow angles  3 x 10  2 lbs    Scissor kicks with toe touch  3 x 10  2 lbs    Pistol squats  2 x 10                        Donald completed therapeutic exercises to develop strength and ROM:    Date: 04/10/2025    Exercises  Sets x Reps  Weight   Cat cows  2 x 10     Figure 4 w/ hamstring floss 2 x 10     Kneeling hip CARs  2 x 10     Standing marches with OH hold 3 x 10  Green theraband and 10 lbs    6 in holds with snow angles  3 x 10  2 lbs    Scissor kicks with toe touch  3 x 10  2 lbs    Pistol squats  2 x 10                        Plan:       1. Come in daily for arm care. Do pre and post throw on pitching days.   2. Physician Referral: no  3. ED Referral:no  4. Parent/Guardian Notified: No  5. All questions were answered, ath. will contact me for  questions or concerns in  the interim.  6.         Eligible to use School Insurance: Yes

## 2025-04-22 NOTE — PROGRESS NOTES
Reason for Encounter N/A    Subjective:       Chief Complaint: Donald Grewal is a 19 y.o. male student at [employer]) who had concerns including Health Maintenance of the Lower Back.    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 Donald has a hx of a lumbar disk herniation, so we are adding in hip mobility and back exercises.     Handedness: right-handed  Sport played: baseball      Level: college      Position:pitcher          ROS              Objective:       General: Donald is well-developed, well-nourished, appears stated age, in no acute distress, alert and oriented to time, place and person.     AT Session          Assessment:     Status: F - Full Participation    Date Seen:  04/13/2025-04/19/2025    Date of Injury:  NA    Date Out:  NA    Date Cleared:  NA        Treatment/Rehab/Maintenance:   Donald completed therapeutic exercises to develop strength and ROM:    Date: 04/16/2025    Exercises  Sets x Reps  Weight   Cat cows  2 x 10     Figure 4 w/ hamstring floss 2 x 10     Kneeling hip CARs  2 x 10     Standing marches with OH hold 3 x 10  Green theraband and 10 lbs    6 in holds with snow angles  3 x 10  2 lbs    Scissor kicks with toe touch  3 x 10  2 lbs    Pistol squats  2 x 10                            Plan:       1. Come in daily for arm care. Do pre and post throw on pitching days.   2. Physician Referral: no  3. ED Referral:no  4. Parent/Guardian Notified: No  5. All questions were answered, ath. will contact me for questions or concerns in  the interim.  6.         Eligible to use School Insurance: Yes

## 2025-05-12 ENCOUNTER — ATHLETIC TRAINING SESSION (OUTPATIENT)
Dept: SPORTS MEDICINE | Facility: CLINIC | Age: 20
End: 2025-05-12
Payer: COMMERCIAL

## 2025-05-12 DIAGNOSIS — Z00.00 HEALTH CARE MAINTENANCE: Primary | ICD-10-CM

## 2025-05-12 NOTE — PROGRESS NOTES
Reason for Encounter N/A    Subjective:       Chief Complaint: Donald Grewal is a 19 y.o. male student at [employer]) who had concerns including Health Maintenance of the Lower Back.    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 Donald has a hx of a lumbar disk herniation, so we are adding in hip mobility and back exercises.     Handedness: right-handed  Sport played: baseball      Level: college      Position:pitcher          ROS              Objective:       General: Donald is well-developed, well-nourished, appears stated age, in no acute distress, alert and oriented to time, place and person.     AT Session          Assessment:     Status: F - Full Participation    Date Seen: 04/20/2025-04/26/2025    Date of Injury: NA    Date Out: NA    Date Cleared: NA        Treatment/Rehab/Maintenance:   Donald completed therapeutic exercises to develop strength and ROM:    Date: 04/24/2025    Exercises  Sets x Reps  Weight   Cat cows  2 x 10     Figure 4 w/ hamstring floss 2 x 10     Kneeling hip CARs  2 x 10     Standing marches with OH hold 3 x 10  Green theraband and 10 lbs    6 in holds with snow angles  3 x 10  2 lbs    Scissor kicks with toe touch  3 x 10  2 lbs    Pistol squats  2 x 10                      Donald completed therapeutic exercises to develop Recovery:    Date: 04/22/2025    Exercises  Sets x Reps  Weight   Cat cows 2 x 10     Band hip circles  2 x 10     Lunge to hamstring stretch  2 x 10     Cross body stretch  2 x 30s     [place] pose  2 x 30s                                           Plan:       1. Come in daily for arm care. Do pre and post throw on pitching days.   2. Physician Referral: no  3. ED Referral:no  4. Parent/Guardian Notified: No  5. All questions were answered, ath. will contact me for questions or concerns in  the interim.  6.         Eligible to use School Insurance: Yes